# Patient Record
Sex: MALE | Race: BLACK OR AFRICAN AMERICAN | NOT HISPANIC OR LATINO | Employment: OTHER | URBAN - METROPOLITAN AREA
[De-identification: names, ages, dates, MRNs, and addresses within clinical notes are randomized per-mention and may not be internally consistent; named-entity substitution may affect disease eponyms.]

---

## 2017-11-29 ENCOUNTER — CLINICAL SUPPORT (OUTPATIENT)
Dept: REHABILITATION | Facility: HOSPITAL | Age: 57
End: 2017-11-29
Attending: FAMILY MEDICINE
Payer: MEDICARE

## 2017-11-29 DIAGNOSIS — R29.898 LEFT LEG WEAKNESS: ICD-10-CM

## 2017-11-29 DIAGNOSIS — R26.81 UNSTEADY GAIT: ICD-10-CM

## 2017-11-29 DIAGNOSIS — I69.30 LATE EFFECT OF CEREBROVASCULAR ACCIDENT (CVA): Primary | ICD-10-CM

## 2017-11-29 PROCEDURE — G8978 MOBILITY CURRENT STATUS: HCPCS | Mod: CK,PN

## 2017-11-29 PROCEDURE — 97110 THERAPEUTIC EXERCISES: CPT | Mod: PN

## 2017-11-29 PROCEDURE — G8979 MOBILITY GOAL STATUS: HCPCS | Mod: CK,PN

## 2017-11-29 PROCEDURE — 97161 PT EVAL LOW COMPLEX 20 MIN: CPT | Mod: PN

## 2017-11-29 NOTE — PLAN OF CARE
TIME RECORD    Date: 11/29/2017    Start Time:  1300  Stop Time:  1400    PROCEDURES:    TIMED  Procedure Time Min.   There ex Start:1330  Stop:1345     Start:  Stop:     Start:  Stop:     Start:  Stop:          UNTIMED  Procedure Time Min.   eval Start:1300  Stop:1330     Start:  Stop:      Total Timed Minutes:  15  Total Timed Units:  1  Total Untimed Units:  1  Charges Billed/# of units:  2    OUTPATIENT PHYSICAL THERAPY   PATIENT EVALUATION  Onset Date: 11/29/11  Primary Diagnosis:   1. Late effect of cerebrovascular accident (CVA)     2. Left leg weakness     3. Unsteady gait       Treatment Diagnosis: debility due to hx of CVA  No past medical history on file.  Precautions: fall  Prior Therapy: for previous CVA  Medications: Surya Villagomez currently has no medications in their medication list.  Nutrition:  Normal  History of Present Illness: reports has sustained two large CVA's in the past - difficulty w/ general mobility as a consequence  Prior Level of Function: Assistive Device  Social History: disabled  Place of Residence (Steps/Adaptations): lives alone in ground floor apartment  Functional Deficits Leading to Referral/Nature of Injury: reports difficulty w/ general mobility  Patient Therapy Goals: improve safety during amb.    Subjective     Surya Villagomez states that his hx of mult. CVA's limits his ability to amb. safely.    Pain:  Location: n/a    Objective     Posture: flexed posture lt UE; increased rigidity in lt sided extremeties  Palpation: n/a  Sensation: intact  DTRs:  Range of Motion/Strength: MMT = 4-/5 lt hip and knee, 2+/5 lt ankle; AROM = limited 25% lt LE throughout    Flexibility: limited lt sided extremities due to hypertonicity  Gait: 100 ft w/ SC and SBA  Analysis: lt sided hip hiking  Bed Mobility: NT  Transfers: Assistance - SBA  Special Tests: Tinetti = 12  Other:   Treatment: x 15 standing gracy. LE there ex (2.5#) = hip ABD, marching, mini squats, calf raises; x 20 ft ea  balance training in // bars = side stepping and backwards gt; x 15 seated gracy. LE there ex = marching (2.5#), ball squeezes, LAQ (2.5#), hip ABD (GTB)    Assessment       Initial Assessment (Pertinent finding, problem list and factors affecting outcome): presents w/ strength/balance/mobility deficits due to hx of mult. CVA's; patient would benefit from PT to address these areas and to establish HEP  Rehab Potiential: good    Short Term Goals (2 Weeks):   1.  Aki. x 20 reps balance there ex  2.  Amb. 200 ft w/ SC and SBA    Long Term Goals (4 Weeks):   1.  Demo comp w/ HEP  2.  Improve Tinetti score to 16    Plan     Certification Period: 11/29/17 to 12/30/17  Recommended Treatment Plan: 2 times per week for 4 weeks (starting wk of 12/03/17): Gait Training, Group Therapy, Manual Therapy, Therapeutic Activites, Therapeutic Exercise and Other HEP  Other Recommendations: NA      Therapist: Emre Phelps, PT    I CERTIFY THE NEED FOR THESE SERVICES FURNISHED UNDER THIS PLAN OF TREATMENT AND WHILE UNDER MY CARE    Physician's comments: ________________________________________________________________________________________________________________________________________________      Physician's Name: ___________________________________

## 2017-12-06 ENCOUNTER — CLINICAL SUPPORT (OUTPATIENT)
Dept: REHABILITATION | Facility: HOSPITAL | Age: 57
End: 2017-12-06
Attending: FAMILY MEDICINE
Payer: MEDICARE

## 2017-12-06 DIAGNOSIS — R29.898 LEFT LEG WEAKNESS: ICD-10-CM

## 2017-12-06 DIAGNOSIS — R26.81 UNSTEADY GAIT: ICD-10-CM

## 2017-12-06 PROCEDURE — 97110 THERAPEUTIC EXERCISES: CPT | Mod: PN

## 2017-12-06 NOTE — PROGRESS NOTES
Name: Surya Villagomez  Clinic Number: [id number]  Date of Treatment: 12/06/2017   Diagnosis:   Encounter Diagnoses   Name Primary?    Left leg weakness     Unsteady gait        Time in: 1252  Time Out: 1348  Total Treatment Time: 56        Subjective:    Surya reports no pain just stiffness on the L side of his body.  Patient reports their pain to be 0/10 on a 0-10 scale with 0 being no pain and 10 being the worst pain imaginable.    Objective    Patient received individual therapy to increase strength, endurance, ROM, flexibility, posture and core stabilization with 0 patients with activities as follows:     Surya received therapeutic exercises to develop strength, endurance, ROM, flexibility, posture and core stabilization for 56 minutes including:     nustep x 10 min L-4  gastroc stretch standing 1/2 roll 3 x 30 sec  Hamstring stretch 3 x 30 sec seated  mini squats 3 x 10 reps  Marching 3 x 10 reps  Standing abd B LE 3 x 10 reps  HR/TR  3 x 10 reps  Bridging x 30 reps  SLR 3 x 10 reps B LE  SAQ 3 x 10 reps B LE      Pt demo good understanding of the education provided. Surya demonstrated good return demonstration of activities.     Assessment:   Increased rigidity on L side.    Pt will continue to benefit from skilled PT intervention. Medical Necessity is demonstrated by:  Fall Risk, Unable to participate in daily activities, Continued inability to participate in vocational pursuits, Unable to participate fully in daily activities, Requires skilled supervision to complete and progress HEP and Weakness.    Patient is making good progress towards established goals.          Plan:  Continue with established Plan of Care towards PT goals.

## 2017-12-11 ENCOUNTER — CLINICAL SUPPORT (OUTPATIENT)
Dept: REHABILITATION | Facility: HOSPITAL | Age: 57
End: 2017-12-11
Attending: FAMILY MEDICINE
Payer: MEDICARE

## 2017-12-11 DIAGNOSIS — R26.81 UNSTEADY GAIT: ICD-10-CM

## 2017-12-11 DIAGNOSIS — R29.898 LEFT LEG WEAKNESS: ICD-10-CM

## 2017-12-11 PROCEDURE — 97110 THERAPEUTIC EXERCISES: CPT | Mod: PN

## 2017-12-15 ENCOUNTER — CLINICAL SUPPORT (OUTPATIENT)
Dept: REHABILITATION | Facility: HOSPITAL | Age: 57
End: 2017-12-15
Attending: FAMILY MEDICINE
Payer: MEDICARE

## 2017-12-15 DIAGNOSIS — I69.30 LATE EFFECT OF CEREBROVASCULAR ACCIDENT (CVA): Primary | ICD-10-CM

## 2017-12-15 PROCEDURE — 97110 THERAPEUTIC EXERCISES: CPT | Mod: PN

## 2017-12-15 PROCEDURE — 97116 GAIT TRAINING THERAPY: CPT | Mod: PN

## 2017-12-15 NOTE — PROGRESS NOTES
Name: Surya Villagomez  Clinic Number: [id number]  Date of Treatment: 12/15/2017   Diagnosis:   Encounter Diagnosis   Name Primary?    Late effect of cerebrovascular accident (CVA) Yes       Time in: 1300  Time Out: 1355  Total Treatment Time: 55  Group Time: NA      Subjective:    Surya reports no real c/o pain.  Patient reports their pain to be n/a/10 on a 0-10 scale with 0 being no pain and 10 being the worst pain imaginable.    Objective    Surya received therapeutic exercises to develop strength, endurance and balance for 45 minutes including:     X 10 min NuStep (L2)  X 20 standing gracy. LE there ex (2#) = mini squats, hip ABD, calf raises, marching  X 15 ft ea balance training w/ CGA/min assist = side stepping and backwards gt  X 20 seated gracy. LE there ex = LAQ (2#), marching (2#), ball squeezes, hip ABD (GTB)  X 15 ea shuttle mini squats and calf raises (50#)  X 15 SportsArt knee curl (22#)  X 15 static standing holds  amb 2 x 100 ft w/ SC and SBA    Assessment:     Mr. Villagomez was able to escobar. tx session w/out increase in symptoms.    Pt will continue to benefit from skilled PT intervention. Medical Necessity is demonstrated by:  Fall Risk, Unable to participate fully in daily activities and Weakness.    Patient is making fair progress towards established goals.    New/Revised goals: N/A      Plan:  Continue with established Plan of Care towards PT goals.

## 2017-12-20 ENCOUNTER — CLINICAL SUPPORT (OUTPATIENT)
Dept: REHABILITATION | Facility: HOSPITAL | Age: 57
End: 2017-12-20
Attending: FAMILY MEDICINE
Payer: MEDICARE

## 2017-12-20 DIAGNOSIS — R29.898 LEFT LEG WEAKNESS: ICD-10-CM

## 2017-12-20 DIAGNOSIS — R26.81 UNSTEADY GAIT: ICD-10-CM

## 2017-12-20 PROCEDURE — 97110 THERAPEUTIC EXERCISES: CPT | Mod: PN

## 2017-12-20 NOTE — PROGRESS NOTES
Name: Surya Villagomez  Clinic Number: [id number]  Date of Treatment: 12/20/2017   Diagnosis:   Encounter Diagnoses   Name Primary?    Left leg weakness     Unsteady gait        Time in: 1158  Time Out: 1251  Total Treatment Time: 53      Subjective:    Surya reports no changes.  Patient reports their pain to be 0/10 on a 0-10 scale with 0 being no pain and 10 being the worst pain imaginable.    Objective  Surya received therapeutic exercises to develop strength, endurance, ROM, flexibility and posture for 53 minutes including:   X 10 min NuStep L3   X 20 standing gracy. LE there ex (2#) = mini squats, hip ABD, calf raises, marching   3 X 15 ft ea balance training w/ CGA/min assist = side stepping and backwards gt   2 X 20 seated gracy. LE there ex = LAQ (2#), marching (2#), ball squeezes, hip ABD (GTB)   2 X 15 ea shuttle mini squats and calf raises (50#)   X 15 SportsArt knee curl (22#)   2 x 20sStatic standing EO FA/EC FT     Amb 2 x 100 ft w/o AD with SBA   Gait training 6ft fwd/bwd/sidestepping    Pt demo good understanding of the education provided. Surya demonstrated good return demonstration of activities.     Assessment:   Patient motivated and performing therex with good knowledge of technique.  Good balance with static standing performed today.    Pt will continue to benefit from skilled PT intervention. Medical Necessity is demonstrated by:  Fall Risk, Unable to participate fully in daily activities, Requires skilled supervision to complete and progress HEP and Weakness.    Patient is making good progress towards established goals.    Plan:  Continue with established Plan of Care towards PT goals.

## 2017-12-27 ENCOUNTER — CLINICAL SUPPORT (OUTPATIENT)
Dept: REHABILITATION | Facility: HOSPITAL | Age: 57
End: 2017-12-27
Attending: FAMILY MEDICINE
Payer: MEDICARE

## 2017-12-27 DIAGNOSIS — R26.81 UNSTEADY GAIT: ICD-10-CM

## 2017-12-27 DIAGNOSIS — R29.898 LEFT LEG WEAKNESS: ICD-10-CM

## 2017-12-27 PROCEDURE — 97110 THERAPEUTIC EXERCISES: CPT | Mod: PN

## 2017-12-27 NOTE — PROGRESS NOTES
Name: Surya Villagomez  Clinic Number: [id number]  Date of Treatment: 12/27/2017   Diagnosis: Left leg weakness due to previous CVA    Time in: 1245  Time Out: 1340  Total Treatment Time: 55      Subjective:    Surya reports improvement of symptoms.  Patient reports their pain to be n/a/10 on a 0-10 scale with 0 being no pain and 10 being the worst pain imaginable.    Objective  Surya received therapeutic exercises to develop strength, endurance, ROM and posture for 55 minutes including:  X 10 min NuStep L3  2 X 20 standing gracy. LE there ex (2#) = mini squats, hip ABD, calf raises, marching  3 X 15 ft ea balance training w/ CGA/min assist = side stepping and backwards gt  2 X 20 seated gracy. LE there ex = LAQ (2#), marching (2#), ball squeezes, hip ABD (GTB)  2 X 15 ea shuttle mini squats and calf raises (50#)  2 X 20 SportsArt knee curl/knee extension (22#)  2 x 20sStatic standing EO FA/EC FT    Amb 2 x 100 ft w/o AD with SBA      Written Home Exercises Provided: Cont with HEP  Pt demo good understanding of the education provided. Surya demonstrated good return demonstration of activities.     Assessment:   Pt will continue to benefit from skilled PT intervention. Medical Necessity is demonstrated by:  Fall Risk, Requires skilled supervision to complete and progress HEP and Weakness.    Patient is making good progress towards established goals.    Plan:  Continue with established Plan of Care towards PT goals.

## 2018-01-10 DIAGNOSIS — I63.9 CVA (CEREBROVASCULAR ACCIDENT): Primary | ICD-10-CM

## 2018-02-01 ENCOUNTER — DOCUMENTATION ONLY (OUTPATIENT)
Dept: REHABILITATION | Facility: HOSPITAL | Age: 58
End: 2018-02-01

## 2018-02-01 ENCOUNTER — CLINICAL SUPPORT (OUTPATIENT)
Dept: REHABILITATION | Facility: HOSPITAL | Age: 58
End: 2018-02-01
Attending: FAMILY MEDICINE
Payer: MEDICARE

## 2018-02-01 NOTE — PROGRESS NOTES
REHAB SERVICES OUTPATIENT DISCHARGE SUMMARY  Physical Therapy      Name:  Surya Villagomez  Date:  02/01/18  Date of Evaluation:  11/29/17  Physician:  Dickson Hinson IV, MD  Total # Of Visits:  6  Cancelled:  2  No Shows:  2  Diagnosis:  No diagnosis found.    Physical/Functional Status:  At time of discharge, patient was able to escobar. x 60 min of strength/mobility training.    The patient is to be discharged from our Therapy service for the following reason(s):  Patient has not attended therapy since 12/27/17.    Degree of Goal Achievement:  Patient has partially met goals    Patient Education:  NA    Discharge Plan:  Other:  Follow up w/ MD

## 2018-02-02 NOTE — PROGRESS NOTES
"REHAB SERVICES OUTPATIENT DISCHARGE SUMMARY  Occupational Therapy      Name:  Surya Villagomez  Date:  2-1-18  Date of Evaluation:  Screened only  Physician:    Total # Of Visits:  0  Cancelled:  0  No Shows:  0  Diagnosis:  R hemiplegia 2/2 CVA in 2011    Physical/Functional Status: Pt arrived to evaluation with R UE in a wrist support brace, cane. This writer screened patient and discharged services based on history of CVA 2011 and subsequent hemiplegia, flexor synergy pattern, R UE contractures. Patient has no AROM R UE. His stated goal was to " get arm working again". Therapist took extended time to explain reason for no OT services needed.    The patient is to be discharged from our Therapy service for the following reason(s):  Other:  Patient has no OT needs.        Patient Education:  Therapist educated patient on role of OT and was educated regarding discharge of services.          "

## 2019-05-10 ENCOUNTER — OFFICE VISIT (OUTPATIENT)
Dept: HEMATOLOGY/ONCOLOGY | Facility: CLINIC | Age: 59
End: 2019-05-10
Payer: MEDICARE

## 2019-05-10 VITALS — RESPIRATION RATE: 16 BRPM | TEMPERATURE: 98 F

## 2019-05-10 DIAGNOSIS — I10 ESSENTIAL HYPERTENSION: ICD-10-CM

## 2019-05-10 DIAGNOSIS — E07.9 THYROID DYSFUNCTION: ICD-10-CM

## 2019-05-10 DIAGNOSIS — D68.59 HYPERCOAGULATION SYNDROME: ICD-10-CM

## 2019-05-10 DIAGNOSIS — I21.9 ACUTE MYOCARDIAL INFARCTION, UNSPECIFIED MI TYPE, UNSPECIFIED ARTERY: ICD-10-CM

## 2019-05-10 DIAGNOSIS — I63.311 CEREBROVASCULAR ACCIDENT (CVA) DUE TO THROMBOSIS OF RIGHT MIDDLE CEREBRAL ARTERY: ICD-10-CM

## 2019-05-10 DIAGNOSIS — D70.8 OTHER NEUTROPENIA: ICD-10-CM

## 2019-05-10 DIAGNOSIS — N18.2 ANEMIA ASSOCIATED WITH STAGE 2 CHRONIC RENAL FAILURE: ICD-10-CM

## 2019-05-10 DIAGNOSIS — Z95.0 PACEMAKER: ICD-10-CM

## 2019-05-10 DIAGNOSIS — K90.9 VITAMIN B12 DEFICIENCY DUE TO INTESTINAL MALABSORPTION: ICD-10-CM

## 2019-05-10 DIAGNOSIS — I25.118 CORONARY ARTERY DISEASE OF NATIVE ARTERY OF NATIVE HEART WITH STABLE ANGINA PECTORIS: ICD-10-CM

## 2019-05-10 DIAGNOSIS — I82.412 DVT FEMORAL (DEEP VENOUS THROMBOSIS) WITH THROMBOPHLEBITIS, LEFT: ICD-10-CM

## 2019-05-10 DIAGNOSIS — D50.0 ANEMIA DUE TO CHRONIC BLOOD LOSS: ICD-10-CM

## 2019-05-10 DIAGNOSIS — D50.0 IRON DEFICIENCY ANEMIA DUE TO CHRONIC BLOOD LOSS: Primary | ICD-10-CM

## 2019-05-10 DIAGNOSIS — D63.1 ANEMIA ASSOCIATED WITH STAGE 2 CHRONIC RENAL FAILURE: ICD-10-CM

## 2019-05-10 DIAGNOSIS — Z83.2 FAMILY HISTORY OF HYPERCOAGULABILITY: ICD-10-CM

## 2019-05-10 DIAGNOSIS — E53.8 VITAMIN B12 DEFICIENCY DUE TO INTESTINAL MALABSORPTION: ICD-10-CM

## 2019-05-10 DIAGNOSIS — Z86.2 PERSONAL HISTORY OF HYPERCOAGULABLE STATE: ICD-10-CM

## 2019-05-10 PROCEDURE — 99204 OFFICE O/P NEW MOD 45 MIN: CPT | Mod: ,,, | Performed by: INTERNAL MEDICINE

## 2019-05-10 PROCEDURE — 99204 PR OFFICE/OUTPT VISIT, NEW, LEVL IV, 45-59 MIN: ICD-10-PCS | Mod: ,,, | Performed by: INTERNAL MEDICINE

## 2019-05-10 RX ORDER — OMEGA-3-ACID ETHYL ESTERS 1 G/1
2 CAPSULE, LIQUID FILLED ORAL
COMMUNITY

## 2019-05-10 RX ORDER — CYANOCOBALAMIN 1000 UG/ML
INJECTION, SOLUTION INTRAMUSCULAR; SUBCUTANEOUS
COMMUNITY
Start: 2019-03-29

## 2019-05-10 RX ORDER — AMIODARONE HYDROCHLORIDE 200 MG/1
TABLET ORAL DAILY
Status: ON HOLD | COMMUNITY
End: 2019-08-21 | Stop reason: HOSPADM

## 2019-05-10 RX ORDER — LISINOPRIL 5 MG/1
TABLET ORAL
Refills: 11 | Status: ON HOLD | COMMUNITY
Start: 2019-05-01 | End: 2019-08-21 | Stop reason: HOSPADM

## 2019-05-10 RX ORDER — CLOPIDOGREL BISULFATE 75 MG/1
TABLET ORAL
COMMUNITY
Start: 2018-04-13

## 2019-05-10 RX ORDER — ATORVASTATIN CALCIUM 20 MG/1
TABLET, FILM COATED ORAL
Refills: 11 | COMMUNITY
Start: 2019-05-01

## 2019-05-10 RX ORDER — FUROSEMIDE 20 MG/1
TABLET ORAL
Refills: 1 | COMMUNITY
Start: 2019-04-15

## 2019-05-10 RX ORDER — AMOXICILLIN 500 MG
1040 CAPSULE ORAL
COMMUNITY

## 2019-05-10 RX ORDER — METOPROLOL SUCCINATE 50 MG/1
75 TABLET, EXTENDED RELEASE ORAL
Status: ON HOLD | COMMUNITY
Start: 2017-06-10 | End: 2019-08-21 | Stop reason: HOSPADM

## 2019-05-10 RX ORDER — NITROGLYCERIN 0.4 MG/1
0.4 TABLET SUBLINGUAL
COMMUNITY
Start: 2018-10-05

## 2019-05-10 NOTE — LETTER
May 10, 2019      Geeta Kim, FN  [address]  [place] [address]           [facility]  [address]  [address] [place] [address]  Phone: [phone]  Fax: [phone]          Patient: Surya Villagomez   MR Number: [id number]   YOB: 1960   Date of Visit: 5/10/2019       Dear Geeta Kim:    Thank you for referring Surya Villagomez to me for evaluation. Attached you will find relevant portions of my assessment and plan of care.    If you have questions, please do not hesitate to call me. I look forward to following Surya Villagomez along with you.    Sincerely,    GURPREET Matta MD    Enclosure  CC:  No Recipients    If you would like to receive this communication electronically, please contact [email or web address] or [phone] to request more information on [employer] Link access.    For providers and/or their staff who would like to refer a patient to [facility], please contact us through our one-stop-shop provider referral line, [facility], at [phone].    If you feel you have received this communication in error or would no longer like to receive these types of communications, please e-mail [email or web address]

## 2019-05-10 NOTE — PROGRESS NOTES
[facility] History & Physical    Subjective:      Patient ID:   Surya Villagomez  58 y.o. male  1960  Geeta Kim NP      Chief Complaint:   Anemia     HPI:  58 y.o. male with history of intermittent anemia and leukopenia. He has been on B-12 injections.    He is status post acute myocardial infarction and has coronary stent, and has pacemaker placement in the past. Other history includes hypertension. He is status post right sided CVA in 2011 and left leg DVT in 2011. He takes Xarelto daily and aspirin 81 mg daily .    He smoked less than 1 pack per week for approximately 30 years and quit approximately 10 years ago. He does not drink alcohol with regularity. He denies allergies to medications. He was a  with the [employer] long-term and is presently on disability.     He has a strong family history of clot events. His mother had myocardial infarction, history of DVT, and had pulmonary embolus after back surgery. He has a brother with history of DVT following surgery and another brother with a history of DVT. Dad had heart disease. He has 2 children alive and well     ROS:   GEN: normal without any fever, night sweats or weight loss  HEENT: See history of present illness.   No HA's, sore throat, stiff neck, changes in vision  CV:  see history of present illness. No CP, SOB, PND, ISBELL or orthopnea  PULM: normal with no SOB, cough, hemoptysis, sputum or pleuritic pain  GI: normal with no abdominal pain, nausea, vomiting, constipation, diarrhea, melanotic stools, BRBPR, or hematemesis  : normal with no hematuria, dysuria  BREAST: normal with no mass, discharge, pain  SKIN: normal with no rash, erythema, bruising, or swelling.        Current Outpatient Medications:     amiodarone (PACERONE) 200 MG Tab, Take by mouth once daily., Disp: , Rfl:     aspirin-calcium carbonate 81 mg-300 mg calcium(777 mg) Tab, Take 81 mg by mouth., Disp: , Rfl:     atorvastatin (LIPITOR) 20 MG tablet, , Disp: , Rfl:  11    clopidogrel (PLAVIX) 75 mg tablet, TAKE 1 TABLET(75 MG) BY MOUTH DAILY, Disp: , Rfl:     cyanocobalamin 1,000 mcg/mL injection, ADMINISTER 1 ML(1000 MCG) IN THE MUSCLE EVERY 30 DAYS, Disp: , Rfl:     fish oil-omega-3 fatty acids 300-1,000 mg capsule, Take 1,040 mg by mouth., Disp: , Rfl:     furosemide (LASIX) 20 MG tablet, TK 1 T PO  D, Disp: , Rfl: 1    lisinopril (PRINIVIL,ZESTRIL) 5 MG tablet, , Disp: , Rfl: 11    metoprolol succinate (TOPROL-XL) 50 MG 24 hr tablet, Take 75 mg by mouth., Disp: , Rfl:     nitroGLYCERIN (NITROSTAT) 0.4 MG SL tablet, Place 0.4 mg under the tongue., Disp: , Rfl:     omega-3 acid ethyl esters (LOVAZA) 1 gram capsule, Take 2 g by mouth., Disp: , Rfl:     rivaroxaban (XARELTO) 15 mg Tab, Take 1 mL by mouth., Disp: , Rfl:           Objective:   Vitals:  Temperature 97.6 °F (36.4 °C), resp. rate 16.    Physical Examination:   GEN: no apparent distress, comfortable  HEAD: atraumatic and normocephalic  EYES: no pallor, no icterus  ENT:  no pharyngeal erythema, external ears WNL; no nasal discharge; no thrush  NECK: no masses, thyroid normal, trachea midline, no LAD/LN's, supple  CV: RRR with no murmur; normal pulse; normal S1 and S2; pacemaker noted at chest wall   CHEST: Normal respiratory effort; CTAB; normal breath sounds; no wheeze or crackles  ABDOM: nontender and nondistended; soft;  no rebound/guarding  MUSC/he has left sided weakness, left arm and hand has contracture with splint in place   EXTREM: no clubbing, cyanosis, or edema   SKIN: no rashes, lesions, ulcers, petechiae   : no CVAT  NEURO: left sided weakness with contracture of left arm and hand. Left leg weakness noted, wheelchair-bound   PSYCH: normal mood, affect and behavior  LYMPH: normal cervical, supraclavicular, axillary and groin LN's        I have ordered laboratory studies at [facility] to evaluate the anemia, leukopenia, history of CVA, DVT, and family history of clot events.    Assessment:    (1) 58 y.o. male with diagnosis of intermittent anemia and leukopenia, currently on B-12 injections.     (2) history of CVA and left leg DVT and family history of multiple members with clot events.    (3) coronary artery disease, hypertension, status post coronary stent placement and status post pacemaker insertion      I have ordered laboratory evaluation to evaluate the anemia and leukopenia. Specifically will be looking for vitamin and iron deficiencies, renal insufficiency, thyroid disorders, protein dyscrasias.    I have ordered laboratory evaluation to evaluate the history of CVA, left leg DVT, and multiple members with pulmonary embolus and DVT    Hypercoagulation panel will be done at [facility]. He will follow-up when necessary 3 weeks to review the results and the [place] office.              Plan:         Follow up in about 3 weeks (around 5/31/2019) for check of blood status after therapy, check blood clot status after therapy..    I have explained and the patient understands all of  the current recommendation(s). I have answered all of their questions to the best of my ability and to their complete satisfaction.

## 2019-05-30 ENCOUNTER — OFFICE VISIT (OUTPATIENT)
Dept: HEMATOLOGY/ONCOLOGY | Facility: CLINIC | Age: 59
End: 2019-05-30
Payer: MEDICARE

## 2019-05-30 VITALS
SYSTOLIC BLOOD PRESSURE: 158 MMHG | RESPIRATION RATE: 22 BRPM | TEMPERATURE: 99 F | DIASTOLIC BLOOD PRESSURE: 103 MMHG | HEART RATE: 98 BPM | WEIGHT: 185 LBS

## 2019-05-30 DIAGNOSIS — R79.1 LOW FACTOR XII ACTIVITY: ICD-10-CM

## 2019-05-30 DIAGNOSIS — R79.89 ELEVATED HOMOCYSTEINE: ICD-10-CM

## 2019-05-30 DIAGNOSIS — D51.8 ANEMIA OF DECREASED VITAMIN B12 ABSORPTION: ICD-10-CM

## 2019-05-30 PROCEDURE — 99213 OFFICE O/P EST LOW 20 MIN: CPT | Mod: 25,,, | Performed by: INTERNAL MEDICINE

## 2019-05-30 PROCEDURE — 96372 PR INJECTION,THERAP/PROPH/DIAG2ST, IM OR SUBCUT: ICD-10-PCS | Mod: ,,, | Performed by: INTERNAL MEDICINE

## 2019-05-30 PROCEDURE — 3080F DIAST BP >= 90 MM HG: CPT | Mod: ,,, | Performed by: INTERNAL MEDICINE

## 2019-05-30 PROCEDURE — 3080F PR MOST RECENT DIASTOLIC BLOOD PRESSURE >= 90 MM HG: ICD-10-PCS | Mod: ,,, | Performed by: INTERNAL MEDICINE

## 2019-05-30 PROCEDURE — 96372 THER/PROPH/DIAG INJ SC/IM: CPT | Mod: ,,, | Performed by: INTERNAL MEDICINE

## 2019-05-30 PROCEDURE — 99213 PR OFFICE/OUTPT VISIT, EST, LEVL III, 20-29 MIN: ICD-10-PCS | Mod: 25,,, | Performed by: INTERNAL MEDICINE

## 2019-05-30 PROCEDURE — 3077F SYST BP >= 140 MM HG: CPT | Mod: ,,, | Performed by: INTERNAL MEDICINE

## 2019-05-30 PROCEDURE — 3077F PR MOST RECENT SYSTOLIC BLOOD PRESSURE >= 140 MM HG: ICD-10-PCS | Mod: ,,, | Performed by: INTERNAL MEDICINE

## 2019-05-30 RX ORDER — CYANOCOBALAMIN 1000 UG/ML
1000 INJECTION, SOLUTION INTRAMUSCULAR; SUBCUTANEOUS ONCE
Status: COMPLETED | OUTPATIENT
Start: 2019-05-30 | End: 2019-05-30

## 2019-05-30 RX ORDER — CYANOCOBALAMIN 1000 UG/ML
1000 INJECTION, SOLUTION INTRAMUSCULAR; SUBCUTANEOUS ONCE
OUTPATIENT
Start: 2019-05-30

## 2019-05-30 RX ADMIN — CYANOCOBALAMIN 1000 MCG: 1000 INJECTION, SOLUTION INTRAMUSCULAR; SUBCUTANEOUS at 04:05

## 2019-05-30 NOTE — LETTER
May 30, 2019      Geeta Kim, GENIE  [address]  [place]  [place] [address]           [facility]  [address]  [place] [address]  Phone: [phone]  Fax: [phone]          Patient: Surya Villagomez   MR Number: [id number]   YOB: 1960   Date of Visit: 5/30/2019       Dear Geeta Kim:    Thank you for referring Surya Villagomez to me for evaluation. Attached you will find relevant portions of my assessment and plan of care.    If you have questions, please do not hesitate to call me. I look forward to following Surya Villagomez along with you.    Sincerely,    Kaci Pollock RN    Enclosure  CC:  No Recipients    If you would like to receive this communication electronically, please contact [email or web address] or [phone] to request more information on [employer] Link access.    For providers and/or their staff who would like to refer a patient to [facility], please contact us through our one-stop-shop provider referral line, [facility], at [phone].    If you feel you have received this communication in error or would no longer like to receive these types of communications, please e-mail [email or web address]

## 2019-06-05 PROBLEM — R79.89 ELEVATED HOMOCYSTEINE: Status: ACTIVE | Noted: 2019-06-05

## 2019-06-05 PROBLEM — R79.1: Status: ACTIVE | Noted: 2019-06-05

## 2019-06-06 NOTE — PROGRESS NOTES
[facility] History & Physical    Subjective:      Patient ID:   Surya Villagomez  59 y.o. male  1960  Geeta Kim NP      Chief Complaint:   Anemia     HPI:  59 y.o. male with history of intermittent anemia and leukopenia. He has been on B-12 injections.    He is status post acute myocardial infarction and has coronary stent, and has pacemaker placement in the past. Other history includes hypertension. He is status post right sided CVA in 2011 and left leg DVT in 2011. He takes Xarelto daily and aspirin 81 mg daily .    He smoked less than 1 pack per week for approximately 30 years and quit approximately 10 years ago. He does not drink alcohol with regularity. He denies allergies to medications. He was a  with the [employer] long-term and is presently on disability.     He has a strong family history of clot events. His mother had myocardial infarction, history of DVT, and had pulmonary embolus after back surgery. He has a brother with history of DVT following surgery and another brother with a history of DVT. Dad had heart disease. He has 2 children alive and well     ROS:   GEN: normal without any fever, night sweats or weight loss  HEENT: See history of present illness.   No HA's, sore throat, stiff neck, changes in vision  CV:  see history of present illness. No CP, SOB, PND, ISBELL or orthopnea  PULM: normal with no SOB, cough, hemoptysis, sputum or pleuritic pain  GI: normal with no abdominal pain, nausea, vomiting, constipation, diarrhea, melanotic stools, BRBPR, or hematemesis  : normal with no hematuria, dysuria  BREAST: normal with no mass, discharge, pain  SKIN: normal with no rash, erythema, bruising, or swelling.        Current Outpatient Medications:     amiodarone (PACERONE) 200 MG Tab, Take by mouth once daily., Disp: , Rfl:     aspirin-calcium carbonate 81 mg-300 mg calcium(777 mg) Tab, Take 81 mg by mouth., Disp: , Rfl:     atorvastatin (LIPITOR) 20 MG tablet, , Disp: , Rfl:  11    clopidogrel (PLAVIX) 75 mg tablet, TAKE 1 TABLET(75 MG) BY MOUTH DAILY, Disp: , Rfl:     cyanocobalamin 1,000 mcg/mL injection, ADMINISTER 1 ML(1000 MCG) IN THE MUSCLE EVERY 30 DAYS, Disp: , Rfl:     fish oil-omega-3 fatty acids 300-1,000 mg capsule, Take 1,040 mg by mouth., Disp: , Rfl:     furosemide (LASIX) 20 MG tablet, TK 1 T PO  D, Disp: , Rfl: 1    lisinopril (PRINIVIL,ZESTRIL) 5 MG tablet, , Disp: , Rfl: 11    metoprolol succinate (TOPROL-XL) 50 MG 24 hr tablet, Take 75 mg by mouth., Disp: , Rfl:     nitroGLYCERIN (NITROSTAT) 0.4 MG SL tablet, Place 0.4 mg under the tongue., Disp: , Rfl:     omega-3 acid ethyl esters (LOVAZA) 1 gram capsule, Take 2 g by mouth., Disp: , Rfl:     rivaroxaban (XARELTO) 15 mg Tab, Take 1 mL by mouth., Disp: , Rfl:           Objective:   Vitals:  Blood pressure (!) 158/103, pulse 98, temperature 98.5 °F (36.9 °C), resp. rate (!) 22, weight 83.9 kg (185 lb).    Physical Examination:   GEN: no apparent distress, comfortable  HEAD: atraumatic and normocephalic  EYES: no pallor, no icterus  ENT:  no pharyngeal erythema, external ears WNL; no nasal discharge; no thrush  NECK: no masses, thyroid normal, trachea midline, no LAD/LN's, supple  CV: RRR with no murmur; normal pulse; normal S1 and S2; pacemaker noted at chest wall   CHEST: Normal respiratory effort; CTAB; normal breath sounds; no wheeze or crackles  ABDOM: nontender and nondistended; soft;  no rebound/guarding  MUSC/he has left sided weakness, left arm and hand has contracture with splint in place   EXTREM: no clubbing, cyanosis, or edema   SKIN: no rashes, lesions, ulcers, petechiae   : no CVAT  NEURO: left sided weakness with contracture of left arm and hand. Left leg weakness noted, wheelchair-bound   PSYCH: normal mood, affect and behavior  LYMPH: normal cervical, supraclavicular, axillary and groin LN's        I have ordered laboratory studies at [facility] to evaluate the anemia, leukopenia,  history of CVA, DVT, and family history of clot events.    Factor 12 is low at 52, this is significant in family hx of clot events.  Also homocystene is increased at 15.  Begin multivitamen daily.    He is on B 12 and Fe daily    Assessment:   (1) 59 y.o. male with diagnosis of intermittent anemia and leukopenia, currently on B-12 injections.     (2) history of CVA and left leg DVT and family history of multiple members with clot events.    Factor 12 is low and homocystene is increased. This is factor in CVA, Clot hx in family.  He has hx of DVT.    (3) coronary artery disease, hypertension, status post coronary stent placement and status post pacemaker insertion.    He is on ASA 81mg, PLAVIX 75 mg and Xarelto 15 mg.  To Ms. Julio NP: do you want to keep him on all 3 meds.  This reduces risk of thrombotic CVA or DVT.  I am concerned about risk of falling and bleeding complications.               Follow up in about 6 months (around 11/30/2019) for check of blood status after therapy.

## 2019-07-11 ENCOUNTER — TELEPHONE (OUTPATIENT)
Dept: HEMATOLOGY/ONCOLOGY | Facility: CLINIC | Age: 59
End: 2019-07-11

## 2019-07-11 NOTE — TELEPHONE ENCOUNTER
Spoke to pharmacy regarding lasix. Stated pt picked up prescription today. It was ordered by Dr. Kim.

## 2019-07-11 NOTE — TELEPHONE ENCOUNTER
----- Message from Aisha Carrero sent at 7/9/2019  4:06 PM CDT -----  The patient called and said that when he picked up his Lasix at the pharmacy they only gave him 2 pills. He said this is incorrect. He wants us to call [facility] on [address] to find out why they only gave him the 2 pills. Please call him back at [phone].

## 2019-08-17 ENCOUNTER — HOSPITAL ENCOUNTER (INPATIENT)
Facility: HOSPITAL | Age: 59
LOS: 4 days | Discharge: HOME | DRG: 291 | End: 2019-08-21
Attending: EMERGENCY MEDICINE | Admitting: INTERNAL MEDICINE
Payer: MEDICARE

## 2019-08-17 DIAGNOSIS — I50.9 CONGESTIVE HEART FAILURE, UNSPECIFIED HF CHRONICITY, UNSPECIFIED HEART FAILURE TYPE: ICD-10-CM

## 2019-08-17 DIAGNOSIS — Z95.810 AICD (AUTOMATIC CARDIOVERTER/DEFIBRILLATOR) PRESENT: ICD-10-CM

## 2019-08-17 DIAGNOSIS — I42.9 CARDIOMYOPATHY: ICD-10-CM

## 2019-08-17 DIAGNOSIS — N17.9 AKI (ACUTE KIDNEY INJURY): ICD-10-CM

## 2019-08-17 DIAGNOSIS — R07.9 CHEST PAIN: Primary | ICD-10-CM

## 2019-08-17 DIAGNOSIS — I49.9 ARRHYTHMIA: ICD-10-CM

## 2019-08-17 DIAGNOSIS — I47.20 V-TACH: ICD-10-CM

## 2019-08-17 LAB
ALBUMIN SERPL BCP-MCNC: 3.6 G/DL (ref 3.5–5.2)
ALP SERPL-CCNC: 63 U/L (ref 55–135)
ALT SERPL W/O P-5'-P-CCNC: 25 U/L (ref 10–44)
ANION GAP SERPL CALC-SCNC: 12 MMOL/L (ref 8–16)
AST SERPL-CCNC: 21 U/L (ref 10–40)
BASOPHILS # BLD AUTO: 0.04 K/UL (ref 0–0.2)
BASOPHILS NFR BLD: 0.9 % (ref 0–1.9)
BILIRUB SERPL-MCNC: 2.5 MG/DL (ref 0.1–1)
BNP SERPL-MCNC: 2404 PG/ML (ref 0–99)
BUN SERPL-MCNC: 31 MG/DL (ref 8–23)
CALCIUM SERPL-MCNC: 8.9 MG/DL (ref 8.7–10.5)
CHLORIDE SERPL-SCNC: 106 MMOL/L (ref 95–110)
CO2 SERPL-SCNC: 22 MMOL/L (ref 23–29)
CREAT SERPL-MCNC: 1.9 MG/DL (ref 0.5–1.4)
DIFFERENTIAL METHOD: ABNORMAL
EOSINOPHIL # BLD AUTO: 0.1 K/UL (ref 0–0.5)
EOSINOPHIL NFR BLD: 1.5 % (ref 0–8)
ERYTHROCYTE [DISTWIDTH] IN BLOOD BY AUTOMATED COUNT: 19.1 % (ref 11.5–14.5)
EST. GFR  (AFRICAN AMERICAN): 43 ML/MIN/1.73 M^2
EST. GFR  (NON AFRICAN AMERICAN): 37.2 ML/MIN/1.73 M^2
GLUCOSE SERPL-MCNC: 103 MG/DL (ref 70–110)
HCT VFR BLD AUTO: 45.2 % (ref 40–54)
HGB BLD-MCNC: 14.4 G/DL (ref 14–18)
IMM GRANULOCYTES # BLD AUTO: 0.01 K/UL (ref 0–0.04)
IMM GRANULOCYTES NFR BLD AUTO: 0.2 % (ref 0–0.5)
INR PPP: 1.5
LYMPHOCYTES # BLD AUTO: 0.9 K/UL (ref 1–4.8)
LYMPHOCYTES NFR BLD: 20 % (ref 18–48)
MCH RBC QN AUTO: 21.9 PG (ref 27–31)
MCHC RBC AUTO-ENTMCNC: 31.9 G/DL (ref 32–36)
MCV RBC AUTO: 69 FL (ref 82–98)
MONOCYTES # BLD AUTO: 0.6 K/UL (ref 0.3–1)
MONOCYTES NFR BLD: 13.1 % (ref 4–15)
NEUTROPHILS # BLD AUTO: 3 K/UL (ref 1.8–7.7)
NEUTROPHILS NFR BLD: 64.3 % (ref 38–73)
NRBC BLD-RTO: 0 /100 WBC
PLATELET # BLD AUTO: 212 K/UL (ref 150–350)
PMV BLD AUTO: ABNORMAL FL (ref 9.2–12.9)
POTASSIUM SERPL-SCNC: 4 MMOL/L (ref 3.5–5.1)
PROT SERPL-MCNC: 6.5 G/DL (ref 6–8.4)
PROTHROMBIN TIME: 17.3 SEC (ref 11.7–14)
RBC # BLD AUTO: 6.57 M/UL (ref 4.6–6.2)
SODIUM SERPL-SCNC: 140 MMOL/L (ref 136–145)
TROPONIN I SERPL DL<=0.01 NG/ML-MCNC: 0.05 NG/ML (ref 0.02–0.04)
WBC # BLD AUTO: 4.64 K/UL (ref 3.9–12.7)

## 2019-08-17 PROCEDURE — 20000000 HC ICU ROOM

## 2019-08-17 PROCEDURE — 85025 COMPLETE CBC W/AUTO DIFF WBC: CPT

## 2019-08-17 PROCEDURE — 96375 TX/PRO/DX INJ NEW DRUG ADDON: CPT

## 2019-08-17 PROCEDURE — 93005 ELECTROCARDIOGRAM TRACING: CPT

## 2019-08-17 PROCEDURE — 85610 PROTHROMBIN TIME: CPT

## 2019-08-17 PROCEDURE — 99285 EMERGENCY DEPT VISIT HI MDM: CPT | Mod: 25

## 2019-08-17 PROCEDURE — 96365 THER/PROPH/DIAG IV INF INIT: CPT

## 2019-08-17 PROCEDURE — 80053 COMPREHEN METABOLIC PANEL: CPT

## 2019-08-17 PROCEDURE — 84484 ASSAY OF TROPONIN QUANT: CPT

## 2019-08-17 PROCEDURE — 63600175 PHARM REV CODE 636 W HCPCS: Performed by: EMERGENCY MEDICINE

## 2019-08-17 PROCEDURE — 83880 ASSAY OF NATRIURETIC PEPTIDE: CPT

## 2019-08-17 RX ORDER — FUROSEMIDE 10 MG/ML
40 INJECTION INTRAMUSCULAR; INTRAVENOUS
Status: COMPLETED | OUTPATIENT
Start: 2019-08-17 | End: 2019-08-17

## 2019-08-17 RX ORDER — AMIODARONE HYDROCHLORIDE 150 MG/3ML
150 INJECTION, SOLUTION INTRAVENOUS
Status: COMPLETED | OUTPATIENT
Start: 2019-08-17 | End: 2019-08-17

## 2019-08-17 RX ORDER — MAGNESIUM SULFATE HEPTAHYDRATE 40 MG/ML
2 INJECTION, SOLUTION INTRAVENOUS ONCE
Status: COMPLETED | OUTPATIENT
Start: 2019-08-18 | End: 2019-08-18

## 2019-08-17 RX ADMIN — AMIODARONE HYDROCHLORIDE 1 MG/MIN: 1.8 INJECTION, SOLUTION INTRAVENOUS at 10:08

## 2019-08-17 RX ADMIN — FUROSEMIDE 40 MG: 10 INJECTION, SOLUTION INTRAMUSCULAR; INTRAVENOUS at 10:08

## 2019-08-17 RX ADMIN — AMIODARONE HYDROCHLORIDE 150 MG: 50 INJECTION, SOLUTION INTRAVENOUS at 10:08

## 2019-08-18 ENCOUNTER — CLINICAL SUPPORT (OUTPATIENT)
Dept: CARDIOLOGY | Facility: HOSPITAL | Age: 59
DRG: 291 | End: 2019-08-18
Attending: EMERGENCY MEDICINE
Payer: MEDICARE

## 2019-08-18 VITALS
SYSTOLIC BLOOD PRESSURE: 124 MMHG | WEIGHT: 195 LBS | HEIGHT: 71 IN | BODY MASS INDEX: 27.3 KG/M2 | DIASTOLIC BLOOD PRESSURE: 93 MMHG

## 2019-08-18 PROBLEM — I50.9 ACUTE DECOMPENSATED HEART FAILURE: Status: ACTIVE | Noted: 2019-08-18

## 2019-08-18 PROBLEM — I63.311 CEREBROVASCULAR ACCIDENT (CVA) DUE TO THROMBOSIS OF RIGHT MIDDLE CEREBRAL ARTERY: Chronic | Status: ACTIVE | Noted: 2019-05-10

## 2019-08-18 PROBLEM — D50.0 ANEMIA DUE TO CHRONIC BLOOD LOSS: Chronic | Status: ACTIVE | Noted: 2019-05-10

## 2019-08-18 PROBLEM — R29.898 LEFT LEG WEAKNESS: Chronic | Status: ACTIVE | Noted: 2017-11-29

## 2019-08-18 PROBLEM — Z95.0 PACEMAKER: Chronic | Status: ACTIVE | Noted: 2019-05-10

## 2019-08-18 PROBLEM — R79.89 ELEVATED TROPONIN: Status: ACTIVE | Noted: 2019-08-18

## 2019-08-18 PROBLEM — D51.8 ANEMIA OF DECREASED VITAMIN B12 ABSORPTION: Chronic | Status: ACTIVE | Noted: 2019-05-30

## 2019-08-18 PROBLEM — D70.8 OTHER NEUTROPENIA: Chronic | Status: ACTIVE | Noted: 2019-05-10

## 2019-08-18 PROBLEM — R79.89 ELEVATED HOMOCYSTEINE: Chronic | Status: ACTIVE | Noted: 2019-06-05

## 2019-08-18 PROBLEM — R79.1: Chronic | Status: ACTIVE | Noted: 2019-06-05

## 2019-08-18 PROBLEM — I25.118 CORONARY ARTERY DISEASE OF NATIVE ARTERY OF NATIVE HEART WITH STABLE ANGINA PECTORIS: Chronic | Status: ACTIVE | Noted: 2019-05-10

## 2019-08-18 PROBLEM — I82.412 DVT FEMORAL (DEEP VENOUS THROMBOSIS) WITH THROMBOPHLEBITIS, LEFT: Chronic | Status: ACTIVE | Noted: 2019-05-10

## 2019-08-18 PROBLEM — D68.59 HYPERCOAGULATION SYNDROME: Chronic | Status: ACTIVE | Noted: 2019-05-10

## 2019-08-18 PROBLEM — I47.20 V-TACH: Status: ACTIVE | Noted: 2019-08-18

## 2019-08-18 PROBLEM — I10 ESSENTIAL HYPERTENSION: Chronic | Status: ACTIVE | Noted: 2019-05-10

## 2019-08-18 LAB
ANION GAP SERPL CALC-SCNC: 13 MMOL/L (ref 8–16)
BASOPHILS # BLD AUTO: 0.02 K/UL (ref 0–0.2)
BASOPHILS NFR BLD: 0.5 % (ref 0–1.9)
BUN SERPL-MCNC: 32 MG/DL (ref 8–23)
CALCIUM SERPL-MCNC: 8.9 MG/DL (ref 8.7–10.5)
CHLORIDE SERPL-SCNC: 109 MMOL/L (ref 95–110)
CHOLEST SERPL-MCNC: 123 MG/DL (ref 120–199)
CHOLEST/HDLC SERPL: 3.2 {RATIO} (ref 2–5)
CK MB SERPL-MCNC: 3.5 NG/ML (ref 0.1–6.5)
CK MB SERPL-MCNC: 3.8 NG/ML (ref 0.1–6.5)
CK MB SERPL-MCNC: 4.1 NG/ML (ref 0.1–6.5)
CK MB SERPL-MCNC: 4.4 NG/ML (ref 0.1–6.5)
CK SERPL-CCNC: 114 U/L (ref 20–200)
CK SERPL-CCNC: 94 U/L (ref 20–200)
CK SERPL-CCNC: 96 U/L (ref 20–200)
CK SERPL-CCNC: 98 U/L (ref 20–200)
CO2 SERPL-SCNC: 20 MMOL/L (ref 23–29)
CREAT SERPL-MCNC: 1.7 MG/DL (ref 0.5–1.4)
DIFFERENTIAL METHOD: ABNORMAL
EOSINOPHIL # BLD AUTO: 0.1 K/UL (ref 0–0.5)
EOSINOPHIL NFR BLD: 2.6 % (ref 0–8)
ERYTHROCYTE [DISTWIDTH] IN BLOOD BY AUTOMATED COUNT: 19 % (ref 11.5–14.5)
EST. GFR  (AFRICAN AMERICAN): 49.2 ML/MIN/1.73 M^2
EST. GFR  (NON AFRICAN AMERICAN): 42.6 ML/MIN/1.73 M^2
GLUCOSE SERPL-MCNC: 123 MG/DL (ref 70–110)
HCT VFR BLD AUTO: 42.7 % (ref 40–54)
HDLC SERPL-MCNC: 38 MG/DL (ref 40–75)
HDLC SERPL: 30.9 % (ref 20–50)
HGB BLD-MCNC: 14.1 G/DL (ref 14–18)
IMM GRANULOCYTES # BLD AUTO: 0.02 K/UL (ref 0–0.04)
IMM GRANULOCYTES NFR BLD AUTO: 0.5 % (ref 0–0.5)
LDLC SERPL CALC-MCNC: 69.6 MG/DL (ref 63–159)
LYMPHOCYTES # BLD AUTO: 0.8 K/UL (ref 1–4.8)
LYMPHOCYTES NFR BLD: 18.1 % (ref 18–48)
MAGNESIUM SERPL-MCNC: 2.2 MG/DL (ref 1.6–2.6)
MCH RBC QN AUTO: 22.4 PG (ref 27–31)
MCHC RBC AUTO-ENTMCNC: 33 G/DL (ref 32–36)
MCV RBC AUTO: 68 FL (ref 82–98)
MONOCYTES # BLD AUTO: 0.6 K/UL (ref 0.3–1)
MONOCYTES NFR BLD: 13.2 % (ref 4–15)
NEUTROPHILS # BLD AUTO: 2.8 K/UL (ref 1.8–7.7)
NEUTROPHILS NFR BLD: 65.1 % (ref 38–73)
NONHDLC SERPL-MCNC: 85 MG/DL
NRBC BLD-RTO: 0 /100 WBC
PLATELET # BLD AUTO: 188 K/UL (ref 150–350)
PMV BLD AUTO: ABNORMAL FL (ref 9.2–12.9)
POTASSIUM SERPL-SCNC: 4 MMOL/L (ref 3.5–5.1)
RBC # BLD AUTO: 6.29 M/UL (ref 4.6–6.2)
SODIUM SERPL-SCNC: 142 MMOL/L (ref 136–145)
TRIGL SERPL-MCNC: 77 MG/DL (ref 30–150)
TROPONIN I SERPL DL<=0.01 NG/ML-MCNC: 0.03 NG/ML (ref 0.02–0.04)
TROPONIN I SERPL DL<=0.01 NG/ML-MCNC: 0.04 NG/ML (ref 0.02–0.04)
TROPONIN I SERPL DL<=0.01 NG/ML-MCNC: 0.04 NG/ML (ref 0.02–0.04)
TROPONIN I SERPL DL<=0.01 NG/ML-MCNC: 0.05 NG/ML (ref 0.02–0.04)
TSH SERPL DL<=0.005 MIU/L-ACNC: 3.28 UIU/ML (ref 0.34–5.6)
WBC # BLD AUTO: 4.25 K/UL (ref 3.9–12.7)

## 2019-08-18 PROCEDURE — 36415 COLL VENOUS BLD VENIPUNCTURE: CPT

## 2019-08-18 PROCEDURE — 25000003 PHARM REV CODE 250: Performed by: NURSE PRACTITIONER

## 2019-08-18 PROCEDURE — 63600175 PHARM REV CODE 636 W HCPCS: Performed by: NURSE PRACTITIONER

## 2019-08-18 PROCEDURE — 20000000 HC ICU ROOM

## 2019-08-18 PROCEDURE — 83735 ASSAY OF MAGNESIUM: CPT

## 2019-08-18 PROCEDURE — 82550 ASSAY OF CK (CPK): CPT | Mod: 91

## 2019-08-18 PROCEDURE — 93005 ELECTROCARDIOGRAM TRACING: CPT

## 2019-08-18 PROCEDURE — 94761 N-INVAS EAR/PLS OXIMETRY MLT: CPT

## 2019-08-18 PROCEDURE — 80048 BASIC METABOLIC PNL TOTAL CA: CPT

## 2019-08-18 PROCEDURE — 80061 LIPID PANEL: CPT

## 2019-08-18 PROCEDURE — 25000003 PHARM REV CODE 250: Performed by: INTERNAL MEDICINE

## 2019-08-18 PROCEDURE — 84443 ASSAY THYROID STIM HORMONE: CPT

## 2019-08-18 PROCEDURE — 84484 ASSAY OF TROPONIN QUANT: CPT

## 2019-08-18 PROCEDURE — 63600175 PHARM REV CODE 636 W HCPCS: Performed by: INTERNAL MEDICINE

## 2019-08-18 PROCEDURE — 93306 TTE W/DOPPLER COMPLETE: CPT

## 2019-08-18 PROCEDURE — 82553 CREATINE MB FRACTION: CPT

## 2019-08-18 PROCEDURE — 85025 COMPLETE CBC W/AUTO DIFF WBC: CPT

## 2019-08-18 RX ORDER — AMIODARONE HYDROCHLORIDE 200 MG/1
200 TABLET ORAL DAILY
Status: DISCONTINUED | OUTPATIENT
Start: 2019-08-18 | End: 2019-08-18

## 2019-08-18 RX ORDER — FUROSEMIDE 10 MG/ML
40 INJECTION INTRAMUSCULAR; INTRAVENOUS
Status: COMPLETED | OUTPATIENT
Start: 2019-08-18 | End: 2019-08-19

## 2019-08-18 RX ORDER — NITROGLYCERIN 0.4 MG/1
0.4 TABLET SUBLINGUAL EVERY 5 MIN PRN
Status: DISCONTINUED | OUTPATIENT
Start: 2019-08-18 | End: 2019-08-21 | Stop reason: HOSPADM

## 2019-08-18 RX ORDER — AMIODARONE HYDROCHLORIDE 200 MG/1
200 TABLET ORAL 3 TIMES DAILY
Status: DISCONTINUED | OUTPATIENT
Start: 2019-08-18 | End: 2019-08-18

## 2019-08-18 RX ORDER — METOPROLOL SUCCINATE 50 MG/1
50 TABLET, EXTENDED RELEASE ORAL DAILY
Status: DISCONTINUED | OUTPATIENT
Start: 2019-08-18 | End: 2019-08-19

## 2019-08-18 RX ORDER — METOPROLOL TARTRATE 50 MG/1
50 TABLET ORAL 2 TIMES DAILY
Status: DISCONTINUED | OUTPATIENT
Start: 2019-08-18 | End: 2019-08-19

## 2019-08-18 RX ORDER — SODIUM,POTASSIUM PHOSPHATES 280-250MG
2 POWDER IN PACKET (EA) ORAL
Status: DISCONTINUED | OUTPATIENT
Start: 2019-08-18 | End: 2019-08-21 | Stop reason: HOSPADM

## 2019-08-18 RX ORDER — POTASSIUM CHLORIDE 20 MEQ/15ML
60 SOLUTION ORAL
Status: DISCONTINUED | OUTPATIENT
Start: 2019-08-18 | End: 2019-08-21 | Stop reason: HOSPADM

## 2019-08-18 RX ORDER — NAPROXEN SODIUM 220 MG/1
81 TABLET, FILM COATED ORAL DAILY
Status: DISCONTINUED | OUTPATIENT
Start: 2019-08-18 | End: 2019-08-21 | Stop reason: HOSPADM

## 2019-08-18 RX ORDER — POTASSIUM CHLORIDE 20 MEQ/15ML
40 SOLUTION ORAL
Status: DISCONTINUED | OUTPATIENT
Start: 2019-08-18 | End: 2019-08-21 | Stop reason: HOSPADM

## 2019-08-18 RX ORDER — LANOLIN ALCOHOL/MO/W.PET/CERES
800 CREAM (GRAM) TOPICAL
Status: DISCONTINUED | OUTPATIENT
Start: 2019-08-18 | End: 2019-08-21 | Stop reason: HOSPADM

## 2019-08-18 RX ORDER — AMIODARONE HYDROCHLORIDE 200 MG/1
200 TABLET ORAL 3 TIMES DAILY
Status: COMPLETED | OUTPATIENT
Start: 2019-08-18 | End: 2019-08-18

## 2019-08-18 RX ORDER — ACETAMINOPHEN 325 MG/1
650 TABLET ORAL EVERY 4 HOURS PRN
Status: DISCONTINUED | OUTPATIENT
Start: 2019-08-18 | End: 2019-08-21 | Stop reason: HOSPADM

## 2019-08-18 RX ORDER — SODIUM CHLORIDE 0.9 % (FLUSH) 0.9 %
10 SYRINGE (ML) INJECTION
Status: DISCONTINUED | OUTPATIENT
Start: 2019-08-18 | End: 2019-08-21 | Stop reason: HOSPADM

## 2019-08-18 RX ORDER — ATORVASTATIN CALCIUM 20 MG/1
20 TABLET, FILM COATED ORAL DAILY
Status: DISCONTINUED | OUTPATIENT
Start: 2019-08-18 | End: 2019-08-21 | Stop reason: HOSPADM

## 2019-08-18 RX ORDER — HYDRALAZINE HYDROCHLORIDE 20 MG/ML
10 INJECTION INTRAMUSCULAR; INTRAVENOUS EVERY 6 HOURS PRN
Status: DISCONTINUED | OUTPATIENT
Start: 2019-08-18 | End: 2019-08-21 | Stop reason: HOSPADM

## 2019-08-18 RX ORDER — ONDANSETRON 2 MG/ML
4 INJECTION INTRAMUSCULAR; INTRAVENOUS EVERY 8 HOURS PRN
Status: DISCONTINUED | OUTPATIENT
Start: 2019-08-18 | End: 2019-08-21 | Stop reason: HOSPADM

## 2019-08-18 RX ADMIN — MAGNESIUM SULFATE 2 G: 2 INJECTION INTRAVENOUS at 12:08

## 2019-08-18 RX ADMIN — ASPIRIN 81 MG 81 MG: 81 TABLET ORAL at 09:08

## 2019-08-18 RX ADMIN — FUROSEMIDE 40 MG: 10 INJECTION, SOLUTION INTRAMUSCULAR; INTRAVENOUS at 09:08

## 2019-08-18 RX ADMIN — AMIODARONE HYDROCHLORIDE 1 MG/MIN: 1.8 INJECTION, SOLUTION INTRAVENOUS at 06:08

## 2019-08-18 RX ADMIN — METOPROLOL TARTRATE 50 MG: 50 TABLET ORAL at 12:08

## 2019-08-18 RX ADMIN — METOPROLOL TARTRATE 50 MG: 50 TABLET ORAL at 08:08

## 2019-08-18 RX ADMIN — AMIODARONE HYDROCHLORIDE 200 MG: 200 TABLET ORAL at 11:08

## 2019-08-18 RX ADMIN — RIVAROXABAN 15 MG: 15 TABLET, FILM COATED ORAL at 04:08

## 2019-08-18 RX ADMIN — FUROSEMIDE 20 MG: 10 INJECTION, SOLUTION INTRAMUSCULAR; INTRAVENOUS at 08:08

## 2019-08-18 RX ADMIN — ATORVASTATIN CALCIUM 20 MG: 20 TABLET, FILM COATED ORAL at 09:08

## 2019-08-18 RX ADMIN — METOPROLOL SUCCINATE 50 MG: 50 TABLET, FILM COATED, EXTENDED RELEASE ORAL at 02:08

## 2019-08-18 RX ADMIN — HYDRALAZINE HYDROCHLORIDE 10 MG: 20 INJECTION INTRAMUSCULAR; INTRAVENOUS at 05:08

## 2019-08-18 RX ADMIN — AMIODARONE HYDROCHLORIDE 0.5 MG/MIN: 1.8 INJECTION, SOLUTION INTRAVENOUS at 05:08

## 2019-08-18 NOTE — ASSESSMENT & PLAN NOTE
And Vfib per [employer] AICD interrogation s/p 5 shocks today  Admit to ICU  Amiodarone gtt  Resume home meds  2 grams mag IV now then check level in AM   Consult Dr. Pate

## 2019-08-18 NOTE — HOSPITAL COURSE
Admitted to ICU on amiodarone infusion. Seen by Oscar HURD and Bernardo NELSON today already.   8/19 remains in ICU. Amiodarone infusion finished last PM. Received 1 dose po. Had 11 beat run ventricular tachycardia this AM (~1100 hours). Asymptomatic. Transferred to basic Cardiology. Had 20 second run of asymptomatic V-tach this AM. Feels well. Expect to discharge in AM.   8/21: multiple runs of non-sustained V-tach, despite increase of metoprolol. Diastolic BP has remained elevated. Remains asymptomatic. Discussed patient with Cardiology: discharge patient on current regimen; will be seen in clinic in 10 days, at which time EP evaluation will be arranged.

## 2019-08-18 NOTE — PLAN OF CARE
Problem: Fall Injury Risk  Goal: Absence of Fall and Fall-Related Injury  Outcome: Ongoing (interventions implemented as appropriate)  Safety features initiated, pt. Instructed how and when to use call lihgt

## 2019-08-18 NOTE — ASSESSMENT & PLAN NOTE
Likely from not taking his medications for 5 days   IV lasix x3 doses 12 hours apart  Accurate I&O  Daily wt   Repeat CXR in AM

## 2019-08-18 NOTE — H&P
[facility] Medicine  History & Physical    Patient Name: Surya Villagomez  MRN: [id number]  Admission Date: 8/17/2019  Attending Physician: Dr. Hatfield  Primary Care Provider: GENIE Bird         Patient information was obtained from patient and ER records.     Subjective:     Principal Problem:<principal problem not specified>    Chief Complaint:   Chief Complaint   Patient presents with    Chest Pain    Shortness of Breath        HPI: Mr. Villagomez presents today with complaints of chest pain. It is severe. It is associated with getting shocked by his AICD multiple times today, SOB, and 5 lb wt gain. He denies fever, chills, N/V/D, dizziness, or LOC. He states he ran out of his cardiac medicine and [facility] couldn't refill them until today so he hasn't had his medication for 5 days. He states he started feeling the pain around 11 am, but waited for his brother to get home with his medication and take that before he came in this afternoon. He has a history of dilated cardiomyopathy, EF 25%, persistent A. fib, hypertension, CKD3, elevated homocysteine levels, decreased factor XII activity, and coagulapathy, history of CVA with left hemiplegia since 2011, LHC from August 2018 by , showed severe OM disease which was stented with 1 BRADLEY, otherwise mild nonobstructive CAD. He was then placed on a lifevest and subsequently had a biventricular AICD placed. His last appt with Dr. Marmolejo was in Feb and at that time he was taken off of zaroxlyn and decreased his lasix d/t hypotension. He states he wants to establish care in here in [place]. In the ED, his AICD was interrogated and showed he received a total of 5 shocks today beginning at 5:49am, 6:45am, and 10:55 am.       Past Medical History:   Diagnosis Date    CHF (congestive heart failure)     Hypertension     Stroke     2011       Past Surgical History:   Procedure Laterality Date    INSERTION OF PACEMAKER         Review of  patient's allergies indicates:  No Known Allergies    No current facility-administered medications on file prior to encounter.      Current Outpatient Medications on File Prior to Encounter   Medication Sig    amiodarone (PACERONE) 200 MG Tab Take by mouth once daily.    aspirin-calcium carbonate 81 mg-300 mg calcium(777 mg) Tab Take 81 mg by mouth.    atorvastatin (LIPITOR) 20 MG tablet     clopidogrel (PLAVIX) 75 mg tablet TAKE 1 TABLET(75 MG) BY MOUTH DAILY    cyanocobalamin 1,000 mcg/mL injection ADMINISTER 1 ML(1000 MCG) IN THE MUSCLE EVERY 30 DAYS    fish oil-omega-3 fatty acids 300-1,000 mg capsule Take 1,040 mg by mouth.    furosemide (LASIX) 20 MG tablet TK 1 T PO  D    lisinopril (PRINIVIL,ZESTRIL) 5 MG tablet     metoprolol succinate (TOPROL-XL) 50 MG 24 hr tablet Take 75 mg by mouth.    nitroGLYCERIN (NITROSTAT) 0.4 MG SL tablet Place 0.4 mg under the tongue.    omega-3 acid ethyl esters (LOVAZA) 1 gram capsule Take 2 g by mouth.    rivaroxaban (XARELTO) 15 mg Tab Take 1 mL by mouth.     Family History     None        Tobacco Use    Smoking status: Former Smoker     Last attempt to quit: 5/10/2009     Years since quitting: 10.2    Smokeless tobacco: Never Used   Substance and Sexual Activity    Alcohol use: Not Currently    Drug use: Not Currently    Sexual activity: Not on file     Review of Systems   Constitutional: Positive for fatigue. Negative for chills, diaphoresis and fever.   HENT: Negative for congestion, ear pain, sore throat and trouble swallowing.    Eyes: Negative for pain, discharge and visual disturbance.   Respiratory: Positive for shortness of breath. Negative for cough, chest tightness and wheezing.    Cardiovascular: Positive for chest pain, palpitations and leg swelling.   Gastrointestinal: Negative for abdominal distention, abdominal pain, blood in stool, constipation, diarrhea, nausea and vomiting.   Endocrine: Negative for polydipsia, polyphagia and polyuria.    Genitourinary: Negative for dysuria, flank pain, frequency and urgency.   Musculoskeletal: Negative for back pain, joint swelling, neck pain and neck stiffness.   Skin: Negative for rash and wound.   Allergic/Immunologic: Negative for immunocompromised state.   Neurological: Negative for dizziness, syncope, speech difficulty, weakness, light-headedness, numbness and headaches.        Left side hemiplegia   Hematological: Negative for adenopathy.   Psychiatric/Behavioral: Negative for confusion and suicidal ideas. The patient is not nervous/anxious.    All other systems reviewed and are negative.    Objective:     Vital Signs (Most Recent):  Temp: 98.5 °F (36.9 °C) (08/17/19 2116)  Pulse: 96 (08/17/19 2341)  Resp: 20 (08/17/19 2341)  BP: (!) 150/102 (08/17/19 2341)  SpO2: 98 % (08/17/19 2341) Vital Signs (24h Range):  Temp:  [98.5 °F (36.9 °C)] 98.5 °F (36.9 °C)  Pulse:  [] 96  Resp:  [20] 20  SpO2:  [95 %-98 %] 98 %  BP: (141-150)/(102) 150/102     Weight: 88.5 kg (195 lb)  Body mass index is 27.2 kg/m².    Physical Exam   Constitutional: He is oriented to person, place, and time. He appears well-developed and well-nourished.   HENT:   Head: Normocephalic and atraumatic.   Eyes: Pupils are equal, round, and reactive to light. Conjunctivae and EOM are normal.   Neck: Normal range of motion. Neck supple.   Cardiovascular: Intact distal pulses.   Murmur heard.  Irregular rate and rhythm    Pulmonary/Chest: Effort normal.   Diminished in bilat bases with crackles in bilat bases   Abdominal: Soft. Bowel sounds are normal.   Musculoskeletal: He exhibits edema and deformity.   Left hand contracted   Neurological: He is alert and oriented to person, place, and time.   Left arm contacted, weakness left leg   Skin: Skin is warm and dry. Capillary refill takes less than 2 seconds.   Psychiatric: He has a normal mood and affect. His behavior is normal. Judgment and thought content normal.   Nursing note and vitals  reviewed.        CRANIAL NERVES     CN III, IV, VI   Pupils are equal, round, and reactive to light.  Extraocular motions are normal.        Significant Labs:   CBC:   Recent Labs   Lab 08/17/19 2146   WBC 4.64   HGB 14.4   HCT 45.2        CMP:   Recent Labs   Lab 08/17/19 2146      K 4.0      CO2 22*      BUN 31*   CREATININE 1.9*   CALCIUM 8.9   PROT 6.5   ALBUMIN 3.6   BILITOT 2.5*   ALKPHOS 63   AST 21   ALT 25   ANIONGAP 12   EGFRNONAA 37.2*     Cardiac Markers:   Recent Labs   Lab 08/17/19 2146   BNP 2,404*     Magnesium: No results for input(s): MG in the last 48 hours.    Significant Imaging: I have reviewed all pertinent imaging results/findings within the past 24 hours.   CXR: Bilat pulm congestion, cardiomegaly      Assessment/Plan:     Elevated troponin  Likely from heart failure and AICD shocks   Will trend      V-tach  And Vfib per [employer] AICD interrogation s/p 5 shocks today  Admit to ICU  Amiodarone gtt  Resume home meds  2 grams mag IV now then check level in AM   Consult Dr. Pate         Acute decompensated heart failure  Likely from not taking his medications for 5 days   IV lasix x3 doses 12 hours apart  Accurate I&O  Daily wt   Repeat CXR in AM         Chest pain  Likely from AICD shocks and heart failure - currently chest pain free  Resume home meds  Trend cards         Hypercoagulation syndrome  Per Dr. Matta, he recommends continued treatment with Xarelto and ASA, I have stopped the plavix based on these recommendations and will defer to cardiology if they would like to continue triple therapy        VTE Risk Mitigation (From admission, onward)    None             Dasia Moore, NP  Department of Hospital Medicine   [facility]

## 2019-08-18 NOTE — SUBJECTIVE & OBJECTIVE
"Interval History: feeling "better".    Review of Systems   Constitutional: Negative.    HENT: Negative.    Eyes: Negative.    Respiratory: Negative.    Cardiovascular: Negative.    Gastrointestinal: Negative.    Endocrine: Negative.    Genitourinary: Negative.    Musculoskeletal: Negative.    Skin: Negative.    Allergic/Immunologic: Negative.    Neurological:        Left arcelia   Hematological: Negative.    All other systems reviewed and are negative.    Objective:     Vital Signs (Most Recent):  Temp: 98.4 °F (36.9 °C) (08/18/19 0200)  Pulse: 77 (08/18/19 1000)  Resp: (!) 34 (08/18/19 1000)  BP: (!) 126/98 (08/18/19 1000)  SpO2: 99 % (08/18/19 1000) Vital Signs (24h Range):  Temp:  [98.4 °F (36.9 °C)-98.5 °F (36.9 °C)] 98.4 °F (36.9 °C)  Pulse:  [] 77  Resp:  [0-46] 34  SpO2:  [89 %-100 %] 99 %  BP: (114-152)/() 126/98     Weight: 88.5 kg (195 lb)  Body mass index is 27.2 kg/m².    Intake/Output Summary (Last 24 hours) at 8/18/2019 1059  Last data filed at 8/18/2019 1000  Gross per 24 hour   Intake 459.55 ml   Output 750 ml   Net -290.45 ml      Physical Exam   Constitutional: He is oriented to person, place, and time. He appears well-developed and well-nourished.   HENT:   Head: Normocephalic and atraumatic.   Mouth/Throat: Oropharynx is clear and moist.   Eyes: Pupils are equal, round, and reactive to light. Conjunctivae and EOM are normal.   Neck: Normal range of motion. Neck supple.   Cardiovascular: Normal rate, regular rhythm, normal heart sounds and intact distal pulses.   Pulmonary/Chest: Effort normal and breath sounds normal.   Abdominal: Soft. Bowel sounds are normal.   Musculoskeletal: Normal range of motion.   Neurological: He is alert and oriented to person, place, and time.   Left arcelia   Skin: Skin is warm and dry. Capillary refill takes less than 2 seconds.   Psychiatric: He has a normal mood and affect. His behavior is normal. Judgment and thought content normal.   Nursing note and vitals " reviewed.      Significant Labs:   CBC:   Recent Labs   Lab 08/17/19 2146 08/18/19  0607   WBC 4.64 4.25   HGB 14.4 14.1   HCT 45.2 42.7    188     CMP:   Recent Labs   Lab 08/17/19 2146 08/18/19  0607    142   K 4.0 4.0    109   CO2 22* 20*    123*   BUN 31* 32*   CREATININE 1.9* 1.7*   CALCIUM 8.9 8.9   PROT 6.5  --    ALBUMIN 3.6  --    BILITOT 2.5*  --    ALKPHOS 63  --    AST 21  --    ALT 25  --    ANIONGAP 12 13   EGFRNONAA 37.2* 42.6*     Cardiac Markers:   Recent Labs   Lab 08/17/19 2146   BNP 2,404*     Troponin:   Recent Labs   Lab 08/17/19 2146 08/18/19  0054 08/18/19  0608   TROPONINI 0.047* 0.046* 0.041*       Significant Imaging: I have reviewed all pertinent imaging results/findings within the past 24 hours.

## 2019-08-18 NOTE — CONSULTS
[facility]  Cardiology  Consult Note    Patient Name: Surya Villagomez  MRN: [id number]  Admission Date: 8/17/2019  Hospital Length of Stay: 1 days  Code Status: Full Code   Attending Provider: Demarcus Cobb MD   Consulting Provider: China Martinez NP  Primary Care Physician: GENIE Bird  Principal Problem:<principal problem not specified>    Patient information was obtained from patient, past medical records and ER records.     Inpatient consult to Cardiology  Consult performed by: Modesta Pate MD  Consult ordered by: Dasia Moore NP        Subjective:     REASON FOR CONSULT: chest pain    HPI:    Mr. Villagomez is a 59 year old male with past medical history significant for MI s/p PCI to OM1 with BRADLEY, CVA, ICD in situ, HFrEF, HTN, hyperlipidemia, and clotting disorder with DVT to LLE. He is followed by Dr. Marmolejo and Dr. Matta. He states that starting 3 days ago, he started feeling shocks from his ICD. He thinks it shocked him 3-5 times. He denies any increased shortness of breath at that time. States he is short of breath at baseline. He denies having any chest pain. Has not felt any palpitations. Denies any lightheadedness, dizziness, or syncope. Denies blood from the urine or stool. He does have a history of CVA in 2011 in which his left arm and leg are weak. He does walk with a cane. He has a strong family history of blood clots. He states in September of 2018, he did have an MI and recalls being told he had a reduced heart function at that time, but does not know his EF. He has continued to have runs of NSVT after starting on Amio drip around 11 o'clock last night.       Upon ICD interrogation, since January 2019- patient noted to have a total of  15 shock terminated VT/VF episodes, 3 failed. 14 episodes lasted over 30 seconds.    3 treated VF and 14 treated VT most recent on 8/17/2019 with total ATP x 8  Past Medical History:   Diagnosis Date    CHF (congestive heart  failure)     Hypertension     Stroke     2011       Past Surgical History:   Procedure Laterality Date    INSERTION OF PACEMAKER         Review of patient's allergies indicates:  No Known Allergies    No current facility-administered medications on file prior to encounter.      Current Outpatient Medications on File Prior to Encounter   Medication Sig    amiodarone (PACERONE) 200 MG Tab Take by mouth once daily.    aspirin-calcium carbonate 81 mg-300 mg calcium(777 mg) Tab Take 81 mg by mouth.    atorvastatin (LIPITOR) 20 MG tablet     clopidogrel (PLAVIX) 75 mg tablet TAKE 1 TABLET(75 MG) BY MOUTH DAILY    cyanocobalamin 1,000 mcg/mL injection ADMINISTER 1 ML(1000 MCG) IN THE MUSCLE EVERY 30 DAYS    fish oil-omega-3 fatty acids 300-1,000 mg capsule Take 1,040 mg by mouth.    furosemide (LASIX) 20 MG tablet TK 1 T PO  D    lisinopril (PRINIVIL,ZESTRIL) 5 MG tablet     metoprolol succinate (TOPROL-XL) 50 MG 24 hr tablet Take 75 mg by mouth.    nitroGLYCERIN (NITROSTAT) 0.4 MG SL tablet Place 0.4 mg under the tongue.    omega-3 acid ethyl esters (LOVAZA) 1 gram capsule Take 2 g by mouth.    rivaroxaban (XARELTO) 15 mg Tab Take 1 mL by mouth.       Scheduled Meds:   amiodarone  200 mg Oral Daily    aspirin  81 mg Oral Daily    atorvastatin  20 mg Oral Daily    furosemide (LASIX) IV  40 mg Intravenous Q12H    metoprolol succinate  50 mg Oral Daily    rivaroxaban  15 mg Oral with dinner     Continuous Infusions:   amiodarone in dextrose 5% 0.5 mg/min (08/18/19 0600)     PRN Meds:.acetaminophen, hydrALAZINE, magnesium oxide, magnesium oxide, nitroGLYCERIN, ondansetron, potassium chloride 10%, potassium chloride 10%, potassium chloride 10%, potassium, sodium phosphates, potassium, sodium phosphates, potassium, sodium phosphates, sodium chloride 0.9%    Family History     None          Tobacco Use    Smoking status: Former Smoker     Last attempt to quit: 5/10/2009     Years since quitting: 10.2     Smokeless tobacco: Never Used   Substance and Sexual Activity    Alcohol use: Not Currently    Drug use: Not Currently    Sexual activity: Not on file       ROS     No significant headaches or sore throat or runny nose.   No recent changes in vision.   No recent changes in hearing.  No dysphagia or odynophagia.  Reports shortness of breath at baseline. Denies chest pain other than soreness.  Denies any cough or hemoptysis.   Denies any abdominal pain, nausea, vomiting, diarrhea or constipation.   Denies any dysuria or polyuria.   Denies any fevers or chills.   Denies any recent significant weight changes.   Denies bleeding diathesis    CARDIOLOGY:    Ventricular paced, rate 80, frequent PVCs    Objective:     Vital Signs (Most Recent):  Temp: 98.4 °F (36.9 °C) (08/18/19 0200)  Pulse: 77 (08/18/19 1000)  Resp: (!) 34 (08/18/19 1000)  BP: (!) 126/98 (08/18/19 1000)  SpO2: 99 % (08/18/19 1000) Vital Signs (24h Range):  Temp:  [98.4 °F (36.9 °C)-98.5 °F (36.9 °C)] 98.4 °F (36.9 °C)  Pulse:  [] 77  Resp:  [0-46] 34  SpO2:  [89 %-100 %] 99 %  BP: (114-152)/() 126/98     Weight: 88.5 kg (195 lb)  Body mass index is 27.2 kg/m².    SpO2: 99 %  O2 Device (Oxygen Therapy): nasal cannula      Intake/Output Summary (Last 24 hours) at 8/18/2019 1101  Last data filed at 8/18/2019 1000  Gross per 24 hour   Intake 459.55 ml   Output 750 ml   Net -290.45 ml       Lines/Drains/Airways     Peripheral Intravenous Line                 Peripheral IV - Single Lumen 08/17/19 20 G Right Hand 1 day         Peripheral IV - Single Lumen 08/17/19 2140 20 G Right Antecubital less than 1 day                Physical Exam    HEENT: Normocephalic, atraumatic, PERRL, Conjunctiva pink, no scleral icterus.   CVS: S1S2+, RRR, no murmurs, rubs or gallops, JVP: Normal.  LUNGS: Fine crackles  ABDOMEN: Soft, NT, BS+  EXTREMITIES: No cyanosis, clubbing or edema. Left sided weakness.   NEURO: AAO X 3.       Significant Labs:   ABG: No results  for input(s): PH, PCO2, HCO3, POCSATURATED, BE in the last 48 hours., Blood Culture: No results for input(s): LABBLOO in the last 48 hours., BMP:   Recent Labs   Lab 08/17/19 2146 08/18/19  0607    123*    142   K 4.0 4.0    109   CO2 22* 20*   BUN 31* 32*   CREATININE 1.9* 1.7*   CALCIUM 8.9 8.9   MG  --  2.2   , CMP   Recent Labs   Lab 08/17/19 2146 08/18/19  0607    142   K 4.0 4.0    109   CO2 22* 20*    123*   BUN 31* 32*   CREATININE 1.9* 1.7*   CALCIUM 8.9 8.9   PROT 6.5  --    ALBUMIN 3.6  --    BILITOT 2.5*  --    ALKPHOS 63  --    AST 21  --    ALT 25  --    ANIONGAP 12 13   ESTGFRAFRICA 43.0* 49.2*   EGFRNONAA 37.2* 42.6*   , CBC   Recent Labs   Lab 08/17/19 2146 08/18/19  0607   WBC 4.64 4.25   HGB 14.4 14.1   HCT 45.2 42.7    188   , INR   Recent Labs   Lab 08/17/19 2146   INR 1.5   , Lipid Panel   Recent Labs   Lab 08/18/19  0607   CHOL 123   HDL 38*   LDLCALC 69.6   TRIG 77   CHOLHDL 30.9   , Troponin   Recent Labs   Lab 08/17/19 2146 08/18/19  0054 08/18/19  0608   TROPONINI 0.047* 0.046* 0.041*    and All pertinent lab results from the last 24 hours have been reviewed.    Significant Imaging: X-Ray: CXR: X-Ray Chest 1 View (CXR): No results found for this visit on 08/17/19.    -Ray Chest AP Portable [[id number]] Resulted: 08/18/19 0634   Order Status: Completed Updated: 08/18/19 0637   Narrative:     EXAMINATION:  XR CHEST AP PORTABLE    CLINICAL HISTORY:  Chest pain, unspecified    COMPARISON:  None available    FINDINGS:  Cardiac silhouette size is moderately enlarged.  Pacing/AICD leads are in place.  Prominence of central pulmonary vascularity.  Indistinctness of peripheral pulmonary vascularity and mildly increased perihilar interstitial markings.  No large pleural effusion or pneumothorax.   Impression:       Cardiomegaly with radiographic findings suggestive mild pulmonary edema.     LEFT HEART CATH 9/262019  RESULTS:  Patient had a very short  left main.  The left main was free of   disease.  Left main bifurcated into the LAD and circumflex.  The LAD was   aneurysmal in its proximal midportion.  There were several small diffusely   diseased diagonal branches.  The remainder of LAD is free of disease.  the   left circumflex is a codominant vessel that had a subtotal stenosis in the   mid first obtuse marginal.  The mid left circumflex had a 40% stenosis in   its midportion.  The right coronary artery had a 50% stenosis in its   midportion.  Left ventriculography was not performed.    CONCLUSIONS:  1.  Dilated cardiomyopathy.  2.  Successful angioplasty and stenting of the mid first obtuse marginal   with a drug-eluting stent.  3.  Sheath was removed using a Vascade closure device.         Assessment and Plan:     IMPRESSION:    Ventricular tachycardia. Multiple episodes. 210 runs of SVT since 1/2019. 9,588 VT episodes since 1/2019. On amio drip and metoprolol  Dilated cardiomyopathy. S/p ICD placement 9/2019 with  Dr. Marmolejo at [facility].   Atrial fibrillation.   ICD in situ. [employer] device.   CHF. Acute on chronic. BNP 2404.   Mildly elevated troponin. Likely related to ICD shocks. Not consistent with ACS.   Cad S/P PCI to first OM with BRADLEY 9/26/2019. Plavix recommended to be stopped by Dr. Matta.   Clotting disorder. Elevated homocysteine level and decreased Factor XII.. Left leg DVT on Xarelto/ASA. Followed by Dr. Matta.   CVA in 2011. Left leg weakness.   CKD.. Creatinine in 2018 was 1.83. 1.7 now.   HTN  Hyperlipidemia.     RECOMMENDATIONS:    1. Check echo.   2. Agree with continuing amiodarone drip.   3. Change metoprolol to 50 mg BID.   4. Check TSH.   5. Further decisions to be based upon hospital course.     Thank you for your consult. I will follow-up with patient. Please contact us if you have any additional questions.    China Martinez, VICKEY  Cardiology   [facility]      I have personally seen and examined the patient. I reviewed  the notes, assessments, and/or procedures performed by Ms China Martinez, I concur with her documentation of Surya Villagomez.

## 2019-08-18 NOTE — ASSESSMENT & PLAN NOTE
Per Dr. Matta, he recommends continued treatment with Xarelto and ASA, I have stopped the plavix based on these recommendations and will defer to cardiology if they would like to continue triple therapy

## 2019-08-18 NOTE — PLAN OF CARE
Problem: Adult Inpatient Plan of Care  Goal: Plan of Care Review  Outcome: Ongoing (interventions implemented as appropriate)  Pt. Has had frequent PVC. No rns of Vtach. Po Amiodarone  Increased frequency 200mg TID being started tonight when Amiodarone drip is finished.

## 2019-08-18 NOTE — ED TRIAGE NOTES
"Pt complains of upper chest wall pain for 2 days. Pt states that he took a "heart medication" prior to EMS arrival and EMS gave breathing treatment in route. Pt states pain free at this time. Pt was also complaining of shortness of breath and weakness. Pt states that he feels like his pacemaker has been "going off."  "

## 2019-08-18 NOTE — SUBJECTIVE & OBJECTIVE
Past Medical History:   Diagnosis Date    CHF (congestive heart failure)     Hypertension     Stroke     2011       Past Surgical History:   Procedure Laterality Date    INSERTION OF PACEMAKER         Review of patient's allergies indicates:  No Known Allergies    No current facility-administered medications on file prior to encounter.      Current Outpatient Medications on File Prior to Encounter   Medication Sig    amiodarone (PACERONE) 200 MG Tab Take by mouth once daily.    aspirin-calcium carbonate 81 mg-300 mg calcium(777 mg) Tab Take 81 mg by mouth.    atorvastatin (LIPITOR) 20 MG tablet     clopidogrel (PLAVIX) 75 mg tablet TAKE 1 TABLET(75 MG) BY MOUTH DAILY    cyanocobalamin 1,000 mcg/mL injection ADMINISTER 1 ML(1000 MCG) IN THE MUSCLE EVERY 30 DAYS    fish oil-omega-3 fatty acids 300-1,000 mg capsule Take 1,040 mg by mouth.    furosemide (LASIX) 20 MG tablet TK 1 T PO  D    lisinopril (PRINIVIL,ZESTRIL) 5 MG tablet     metoprolol succinate (TOPROL-XL) 50 MG 24 hr tablet Take 75 mg by mouth.    nitroGLYCERIN (NITROSTAT) 0.4 MG SL tablet Place 0.4 mg under the tongue.    omega-3 acid ethyl esters (LOVAZA) 1 gram capsule Take 2 g by mouth.    rivaroxaban (XARELTO) 15 mg Tab Take 1 mL by mouth.     Family History     None        Tobacco Use    Smoking status: Former Smoker     Last attempt to quit: 5/10/2009     Years since quitting: 10.2    Smokeless tobacco: Never Used   Substance and Sexual Activity    Alcohol use: Not Currently    Drug use: Not Currently    Sexual activity: Not on file     Review of Systems   Constitutional: Positive for fatigue. Negative for chills, diaphoresis and fever.   HENT: Negative for congestion, ear pain, sore throat and trouble swallowing.    Eyes: Negative for pain, discharge and visual disturbance.   Respiratory: Positive for shortness of breath. Negative for cough, chest tightness and wheezing.    Cardiovascular: Positive for chest pain, palpitations  and leg swelling.   Gastrointestinal: Negative for abdominal distention, abdominal pain, blood in stool, constipation, diarrhea, nausea and vomiting.   Endocrine: Negative for polydipsia, polyphagia and polyuria.   Genitourinary: Negative for dysuria, flank pain, frequency and urgency.   Musculoskeletal: Negative for back pain, joint swelling, neck pain and neck stiffness.   Skin: Negative for rash and wound.   Allergic/Immunologic: Negative for immunocompromised state.   Neurological: Negative for dizziness, syncope, speech difficulty, weakness, light-headedness, numbness and headaches.        Left side hemiplegia   Hematological: Negative for adenopathy.   Psychiatric/Behavioral: Negative for confusion and suicidal ideas. The patient is not nervous/anxious.    All other systems reviewed and are negative.    Objective:     Vital Signs (Most Recent):  Temp: 98.5 °F (36.9 °C) (08/17/19 2116)  Pulse: 96 (08/17/19 2341)  Resp: 20 (08/17/19 2341)  BP: (!) 150/102 (08/17/19 2341)  SpO2: 98 % (08/17/19 2341) Vital Signs (24h Range):  Temp:  [98.5 °F (36.9 °C)] 98.5 °F (36.9 °C)  Pulse:  [] 96  Resp:  [20] 20  SpO2:  [95 %-98 %] 98 %  BP: (141-150)/(102) 150/102     Weight: 88.5 kg (195 lb)  Body mass index is 27.2 kg/m².    Physical Exam   Constitutional: He is oriented to person, place, and time. He appears well-developed and well-nourished.   HENT:   Head: Normocephalic and atraumatic.   Eyes: Pupils are equal, round, and reactive to light. Conjunctivae and EOM are normal.   Neck: Normal range of motion. Neck supple.   Cardiovascular: Intact distal pulses.   Murmur heard.  Irregular rate and rhythm    Pulmonary/Chest: Effort normal.   Diminished in bilat bases with crackles in bilat bases   Abdominal: Soft. Bowel sounds are normal.   Musculoskeletal: He exhibits edema and deformity.   Left hand contracted   Neurological: He is alert and oriented to person, place, and time.   Left arm contacted, weakness left leg    Skin: Skin is warm and dry. Capillary refill takes less than 2 seconds.   Psychiatric: He has a normal mood and affect. His behavior is normal. Judgment and thought content normal.   Nursing note and vitals reviewed.        CRANIAL NERVES     CN III, IV, VI   Pupils are equal, round, and reactive to light.  Extraocular motions are normal.        Significant Labs:   CBC:   Recent Labs   Lab 08/17/19 2146   WBC 4.64   HGB 14.4   HCT 45.2        CMP:   Recent Labs   Lab 08/17/19 2146      K 4.0      CO2 22*      BUN 31*   CREATININE 1.9*   CALCIUM 8.9   PROT 6.5   ALBUMIN 3.6   BILITOT 2.5*   ALKPHOS 63   AST 21   ALT 25   ANIONGAP 12   EGFRNONAA 37.2*     Cardiac Markers:   Recent Labs   Lab 08/17/19 2146   BNP 2,404*     Magnesium: No results for input(s): MG in the last 48 hours.    Significant Imaging: I have reviewed all pertinent imaging results/findings within the past 24 hours.   CXR: Bilat pulm congestion, cardiomegaly

## 2019-08-18 NOTE — PROGRESS NOTES
"[facility]  Progress Note    Patient Name: Surya Villagomez  MRN: [id number]  Patient Class: IP- Inpatient   Admission Date: 8/17/2019  Length of Stay: 1 days  Attending Physician: Demarcus Cobb MD  Primary Care Provider: GENIE Bird        Subjective:     Principal Problem:V-tach        HPI:  Mr. Villagomez presents today with complaints of chest pain. It is severe. It is associated with getting shocked by his AICD multiple times today, SOB, and 5 lb wt gain. He denies fever, chills, N/V/D, dizziness, or LOC. He states he ran out of his cardiac medicine and [facility] couldn't refill them until today so he hasn't had his medication for 5 days. He states he started feeling the pain around 11 am, but waited for his brother to get home with his medication and take that before he came in this afternoon. He has a history of dilated cardiomyopathy, EF 25%, persistent A. fib, hypertension, CKD3, elevated homocysteine levels, decreased factor XII activity, and coagulapathy, history of CVA with left hemiplegia since 2011, LHC from August 2018 by , showed severe OM disease which was stented with 1 BRADLEY, otherwise mild nonobstructive CAD. He was then placed on a lifevest and subsequently had a biventricular AICD placed. His last appt with Dr. Marmolejo was in Feb and at that time he was taken off of zaroxlyn and decreased his lasix d/t hypotension. He states he wants to establish care in here in [place]. In the ED, his AICD was interrogated and showed he received a total of 5 shocks today beginning at 5:49am, 6:45am, and 10:55 am.       Overview/Hospital Course:  Admitted to ICU on amiodarone infusion. Seen by Oscar HURD and Bernardo NELSON today already.     Interval History: feeling "better".    Review of Systems   Constitutional: Negative.    HENT: Negative.    Eyes: Negative.    Respiratory: Negative.    Cardiovascular: Negative.    Gastrointestinal: Negative.    Endocrine: Negative.  "   Genitourinary: Negative.    Musculoskeletal: Negative.    Skin: Negative.    Allergic/Immunologic: Negative.    Neurological:        Left arcelia   Hematological: Negative.    All other systems reviewed and are negative.    Objective:     Vital Signs (Most Recent):  Temp: 98.4 °F (36.9 °C) (08/18/19 0200)  Pulse: 77 (08/18/19 1000)  Resp: (!) 34 (08/18/19 1000)  BP: (!) 126/98 (08/18/19 1000)  SpO2: 99 % (08/18/19 1000) Vital Signs (24h Range):  Temp:  [98.4 °F (36.9 °C)-98.5 °F (36.9 °C)] 98.4 °F (36.9 °C)  Pulse:  [] 77  Resp:  [0-46] 34  SpO2:  [89 %-100 %] 99 %  BP: (114-152)/() 126/98     Weight: 88.5 kg (195 lb)  Body mass index is 27.2 kg/m².    Intake/Output Summary (Last 24 hours) at 8/18/2019 1059  Last data filed at 8/18/2019 1000  Gross per 24 hour   Intake 459.55 ml   Output 750 ml   Net -290.45 ml      Physical Exam   Constitutional: He is oriented to person, place, and time. He appears well-developed and well-nourished.   HENT:   Head: Normocephalic and atraumatic.   Mouth/Throat: Oropharynx is clear and moist.   Eyes: Pupils are equal, round, and reactive to light. Conjunctivae and EOM are normal.   Neck: Normal range of motion. Neck supple.   Cardiovascular: Normal rate, regular rhythm, normal heart sounds and intact distal pulses.   Pulmonary/Chest: Effort normal and breath sounds normal.   Abdominal: Soft. Bowel sounds are normal.   Musculoskeletal: Normal range of motion.   Neurological: He is alert and oriented to person, place, and time.   Left arcelia   Skin: Skin is warm and dry. Capillary refill takes less than 2 seconds.   Psychiatric: He has a normal mood and affect. His behavior is normal. Judgment and thought content normal.   Nursing note and vitals reviewed.      Significant Labs:   CBC:   Recent Labs   Lab 08/17/19  2146 08/18/19  0607   WBC 4.64 4.25   HGB 14.4 14.1   HCT 45.2 42.7    188     CMP:   Recent Labs   Lab 08/17/19  2146 08/18/19  0607    142   K 4.0  4.0    109   CO2 22* 20*    123*   BUN 31* 32*   CREATININE 1.9* 1.7*   CALCIUM 8.9 8.9   PROT 6.5  --    ALBUMIN 3.6  --    BILITOT 2.5*  --    ALKPHOS 63  --    AST 21  --    ALT 25  --    ANIONGAP 12 13   EGFRNONAA 37.2* 42.6*     Cardiac Markers:   Recent Labs   Lab 08/17/19 2146   BNP 2,404*     Troponin:   Recent Labs   Lab 08/17/19  2146 08/18/19  0054 08/18/19  0608   TROPONINI 0.047* 0.046* 0.041*       Significant Imaging: I have reviewed all pertinent imaging results/findings within the past 24 hours.      Assessment/Plan:      * V-tach  And Vfib per [employer] AICD interrogation s/p 5 shocks today  Admit to ICU  Amiodarone gtt  Resume home meds  2 grams mag IV now then check level in AM   Consult Dr. Pate         Elevated troponin  Likely from heart failure and AICD shocks   Will trend      Acute decompensated heart failure  Likely from not taking his medications for 5 days   IV lasix x3 doses 12 hours apart  Accurate I&O  Daily wt   Repeat CXR in AM         Chest pain  Likely from AICD shocks and heart failure - currently chest pain free  Resume home meds  Trend cards         Hypercoagulation syndrome  Per Dr. Matta, he recommends continued treatment with Xarelto and ASA, I have stopped the plavix based on these recommendations and will defer to cardiology if they would like to continue triple therapy        VTE Risk Mitigation (From admission, onward)        Ordered     rivaroxaban tablet 15 mg  with dinner      08/18/19 0117     IP VTE HIGH RISK PATIENT  Once      08/18/19 0117     Reason for No Pharmacological VTE Prophylaxis  Once      08/18/19 0117                Demarcus Cobb MD  Department of Hospital Medicine   [facility]

## 2019-08-18 NOTE — ED PROVIDER NOTES
Encounter Date: 8/17/2019       History     Chief Complaint   Patient presents with    Chest Pain    Shortness of Breath     HPI     59-year-old male with past medical history of CHF, hypertension, CVA in 2011, DVT on Xarelto, CAD status post MI with stent placed in August of 2018, cardiomyopathy with ICD in place who presents with left-sided chest pain and the sensation that his pacemaker was firing.  Patient states that his chest pain began around 11:00 a.m. this morning.  He describes it as a heavy like sensation that does not radiate.  He states he was walking his living room when the chest pain began.  He then noticed what felt like a shock from his ICD around noon today.  He did not come to the ED right away because he thought if he took his heart feels which he does not know the name of at this time, the pain in the front of the ICD would get better.  His chest pain is resolved at this time.  His cardiologist is Dr. Marmolejo in [place], [place].  He denies diaphoresis, shortness of breath, nausea vomiting, abdominal pain, weakness or syncope.    Review of patient's allergies indicates:  No Known Allergies  Past Medical History:   Diagnosis Date    CHF (congestive heart failure)     Hypertension     Stroke     2011     Past Surgical History:   Procedure Laterality Date    INSERTION OF PACEMAKER       No family history on file.  Social History     Tobacco Use    Smoking status: Former Smoker     Last attempt to quit: 5/10/2009     Years since quitting: 10.2    Smokeless tobacco: Never Used   Substance Use Topics    Alcohol use: Not Currently    Drug use: Not Currently     Review of Systems   Constitutional: Negative for chills and fever.   HENT: Negative for drooling.    Eyes: Negative for visual disturbance.   Respiratory: Negative for shortness of breath.    Cardiovascular: Positive for chest pain.   Gastrointestinal: Negative for abdominal pain, nausea and vomiting.   Genitourinary: Negative  for dysuria.   Musculoskeletal: Negative for back pain and neck pain.   Neurological: Negative for syncope, weakness, numbness and headaches.   Psychiatric/Behavioral: Negative for confusion.       Physical Exam     Initial Vitals [08/17/19 2116]   BP Pulse Resp Temp SpO2   -- 106 20 98.5 °F (36.9 °C) 98 %      MAP       --         Physical Exam    Constitutional:  Non-toxic appearance. He does not appear ill. No distress.   HENT:   Head: Normocephalic and atraumatic.   Eyes: EOM are normal. Pupils are equal, round, and reactive to light.   Neck: Normal range of motion. Neck supple. No JVD present.   Cardiovascular: Regular rhythm and intact distal pulses. Tachycardia present.  Exam reveals no gallop and no decreased pulses.    No murmur heard.The patient has a device (subcutaneous AICD) in the left chest.   Pulmonary/Chest: Effort normal. No respiratory distress. He has decreased breath sounds in the right lower field and the left lower field. He has no wheezes. He has no rhonchi. He has no rales.   Abdominal: Soft. Bowel sounds are normal. He exhibits no distension, no ascites and no mass. There is no splenomegaly or hepatomegaly. There is no tenderness. There is no rebound and no guarding.   Musculoskeletal:   Left upper extremity contracture   Neurological: He is alert.   Skin: Skin is warm and dry.       HO-IV MDM  This is an emergent evaluation of a 59-year-old male with past medical history of CHF, hypertension, CVA in 2011, DVT on Xarelto, CAD status post MI with stent placed in August of 2018, cardiomyopathy with ICD in place who presents with left-sided chest pain and the sensation that his pacemaker was firing.  On presentation blood pressure 141/102.  Remainder of vitals within normal limits. Physical exam notable for nontoxic-appearing male in no acute distress. AICD in the left upper chest wall.  Heart regular rhythm with tachycardia at a rate of 108 beats per minute, no murmurs, gallops or rubs. Lungs  with decreased breath sounds in bilateral lower lung fields, no wheeze, rales or rhonchi.  There is a contracture to the left upper extremity.  Remainder of exam benign. DDx includes but is not limited to arrhythmia, ACS, CHF exacerbation.  We also consider however clinically less likely to be aortic dissection, PE, pneumonia, pneumothorax. Will obtain labs and imaging including CBC, CMP, troponin, BNP, chest x-ray, EKG.  Initial EKG showed a ventricular paced rhythm with a rate of 96 beats per minute, normal axis and interval.  No old EKG in epic to compare.  While obtaining history and performed physical, patient began to have what appeared to be ventricular tachycardia  Will also provide pain medication as need. Will continue to monitor and frequently reassess pending results of labs, treatments and final disposition. Case discussed with Dr. London    Patient is aware of plan and is amenable.     Priscila Perry D.O  \[facility]\"" EMERGENCY MEDICINE PGY-4  9:44 PM 08/17/2019    HO-IV UPDATE  Patient's AICD interrogated and revealed that patient had 15 shocks since January of 2019, with 5 of those events occurred today as a result of ventricular fibrillation/ventricular tachycardia. The most recent shock was administered at 10:45 a.m..  Given this interpretation in the runs of [place] while an apartment, patient was started on amiodarone.  Labs remarkable for a BUN of 31 and creatinine of 1.9.  A troponin of 0.047.  BNP of 2404.  Patient was discussed with hospital medicine for admission.    Priscila Perry D.O  \[facility]\"" EMERGENCY MEDICINE PGY-4  11:02 PM 08/17/2019    HO-IV UPDATE  Patient evaluated by Hospital Medicine who recommended consultation cardiology.  Spoke with Dr. Miller.  No new recommendations given a patient admitted to the ICU.  He remained hemodynamically stable while in our department.    Priscila Perry D.O  \[facility]\"" EMERGENCY MEDICINE PGY-4  11:38 PM 08/17/2019    ED Course   Procedures  Labs  Reviewed   CBC W/ AUTO DIFFERENTIAL   COMPREHENSIVE METABOLIC PANEL   B-TYPE NATRIURETIC PEPTIDE   TROPONIN I   PROTIME-INR          Imaging Results    None                               Clinical Impression:       ICD-10-CM ICD-9-CM   1. Chest pain R07.9 786.50   2. Congestive heart failure, unspecified HF chronicity, unspecified heart failure type I50.9 428.0   3. CROW (acute kidney injury) N17.9 584.9   4. Arrhythmia I49.9 427.9                                Priscila Perry, DO  Resident  08/18/19 0634

## 2019-08-18 NOTE — ASSESSMENT & PLAN NOTE
Likely from AICD shocks and heart failure - currently chest pain free  Resume home meds  Trend cards

## 2019-08-19 PROBLEM — I47.20 VENTRICULAR TACHYCARDIA: Status: ACTIVE | Noted: 2019-08-19

## 2019-08-19 LAB
ANION GAP SERPL CALC-SCNC: 13 MMOL/L (ref 8–16)
ANION GAP SERPL CALC-SCNC: 18 MMOL/L (ref 8–16)
BASOPHILS # BLD AUTO: 0.02 K/UL (ref 0–0.2)
BASOPHILS NFR BLD: 0.5 % (ref 0–1.9)
BUN SERPL-MCNC: 30 MG/DL (ref 6–30)
BUN SERPL-MCNC: 31 MG/DL (ref 8–23)
CALCIUM SERPL-MCNC: 9 MG/DL (ref 8.7–10.5)
CHLORIDE SERPL-SCNC: 105 MMOL/L (ref 95–110)
CHLORIDE SERPL-SCNC: 108 MMOL/L (ref 95–110)
CK MB SERPL-MCNC: 3.3 NG/ML (ref 0.1–6.5)
CK MB SERPL-MCNC: 3.7 NG/ML (ref 0.1–6.5)
CK MB SERPL-MCNC: 3.8 NG/ML (ref 0.1–6.5)
CK SERPL-CCNC: 101 U/L (ref 20–200)
CK SERPL-CCNC: 103 U/L (ref 20–200)
CK SERPL-CCNC: 118 U/L (ref 20–200)
CO2 SERPL-SCNC: 23 MMOL/L (ref 23–29)
CREAT SERPL-MCNC: 1.6 MG/DL (ref 0.5–1.4)
CREAT SERPL-MCNC: 1.9 MG/DL (ref 0.5–1.4)
DIFFERENTIAL METHOD: ABNORMAL
EOSINOPHIL # BLD AUTO: 0.2 K/UL (ref 0–0.5)
EOSINOPHIL NFR BLD: 4.8 % (ref 0–8)
ERYTHROCYTE [DISTWIDTH] IN BLOOD BY AUTOMATED COUNT: 19.3 % (ref 11.5–14.5)
EST. GFR  (AFRICAN AMERICAN): 53 ML/MIN/1.73 M^2
EST. GFR  (NON AFRICAN AMERICAN): 45.8 ML/MIN/1.73 M^2
GLUCOSE SERPL-MCNC: 100 MG/DL (ref 70–110)
GLUCOSE SERPL-MCNC: 96 MG/DL (ref 70–110)
HCT VFR BLD AUTO: 44.3 % (ref 40–54)
HCT VFR BLD CALC: 48 %PCV (ref 36–54)
HGB BLD-MCNC: 14.3 G/DL (ref 14–18)
IMM GRANULOCYTES # BLD AUTO: 0.02 K/UL (ref 0–0.04)
IMM GRANULOCYTES NFR BLD AUTO: 0.5 % (ref 0–0.5)
LYMPHOCYTES # BLD AUTO: 0.9 K/UL (ref 1–4.8)
LYMPHOCYTES NFR BLD: 21.3 % (ref 18–48)
MAGNESIUM SERPL-MCNC: 2.1 MG/DL (ref 1.6–2.6)
MCH RBC QN AUTO: 21.7 PG (ref 27–31)
MCHC RBC AUTO-ENTMCNC: 32.3 G/DL (ref 32–36)
MCV RBC AUTO: 67 FL (ref 82–98)
MONOCYTES # BLD AUTO: 0.7 K/UL (ref 0.3–1)
MONOCYTES NFR BLD: 14.9 % (ref 4–15)
NEUTROPHILS # BLD AUTO: 2.5 K/UL (ref 1.8–7.7)
NEUTROPHILS NFR BLD: 58 % (ref 38–73)
NRBC BLD-RTO: 0 /100 WBC
PLATELET # BLD AUTO: 213 K/UL (ref 150–350)
PMV BLD AUTO: ABNORMAL FL (ref 9.2–12.9)
POC IONIZED CALCIUM: 1.09 MMOL/L (ref 1.06–1.42)
POC TCO2 (MEASURED): 22 MMOL/L (ref 23–29)
POTASSIUM BLD-SCNC: 4 MMOL/L (ref 3.5–5.1)
POTASSIUM SERPL-SCNC: 3.6 MMOL/L (ref 3.5–5.1)
POTASSIUM SERPL-SCNC: 4.2 MMOL/L (ref 3.5–5.1)
RBC # BLD AUTO: 6.59 M/UL (ref 4.6–6.2)
SAMPLE: ABNORMAL
SODIUM BLD-SCNC: 142 MMOL/L (ref 136–145)
SODIUM SERPL-SCNC: 141 MMOL/L (ref 136–145)
TROPONIN I SERPL DL<=0.01 NG/ML-MCNC: 0.04 NG/ML (ref 0.02–0.04)
WBC # BLD AUTO: 4.36 K/UL (ref 3.9–12.7)

## 2019-08-19 PROCEDURE — 84484 ASSAY OF TROPONIN QUANT: CPT

## 2019-08-19 PROCEDURE — 94761 N-INVAS EAR/PLS OXIMETRY MLT: CPT

## 2019-08-19 PROCEDURE — 84132 ASSAY OF SERUM POTASSIUM: CPT

## 2019-08-19 PROCEDURE — 80048 BASIC METABOLIC PNL TOTAL CA: CPT

## 2019-08-19 PROCEDURE — 25000003 PHARM REV CODE 250: Performed by: NURSE PRACTITIONER

## 2019-08-19 PROCEDURE — 82550 ASSAY OF CK (CPK): CPT

## 2019-08-19 PROCEDURE — 82553 CREATINE MB FRACTION: CPT

## 2019-08-19 PROCEDURE — 84484 ASSAY OF TROPONIN QUANT: CPT | Mod: 91

## 2019-08-19 PROCEDURE — 63600175 PHARM REV CODE 636 W HCPCS: Performed by: NURSE PRACTITIONER

## 2019-08-19 PROCEDURE — 93005 ELECTROCARDIOGRAM TRACING: CPT

## 2019-08-19 PROCEDURE — 25000003 PHARM REV CODE 250: Performed by: INTERNAL MEDICINE

## 2019-08-19 PROCEDURE — 25000003 PHARM REV CODE 250

## 2019-08-19 PROCEDURE — 83735 ASSAY OF MAGNESIUM: CPT

## 2019-08-19 PROCEDURE — 85025 COMPLETE CBC W/AUTO DIFF WBC: CPT

## 2019-08-19 PROCEDURE — 21400001 HC TELEMETRY ROOM

## 2019-08-19 PROCEDURE — 82550 ASSAY OF CK (CPK): CPT | Mod: 91

## 2019-08-19 PROCEDURE — 82553 CREATINE MB FRACTION: CPT | Mod: 91

## 2019-08-19 PROCEDURE — 36415 COLL VENOUS BLD VENIPUNCTURE: CPT

## 2019-08-19 RX ORDER — AMIODARONE HYDROCHLORIDE 200 MG/1
400 TABLET ORAL 2 TIMES DAILY
Status: DISCONTINUED | OUTPATIENT
Start: 2019-08-19 | End: 2019-08-19

## 2019-08-19 RX ORDER — AMIODARONE HYDROCHLORIDE 200 MG/1
200 TABLET ORAL 3 TIMES DAILY
Status: DISCONTINUED | OUTPATIENT
Start: 2019-08-19 | End: 2019-08-19

## 2019-08-19 RX ORDER — METOPROLOL SUCCINATE 50 MG/1
50 TABLET, EXTENDED RELEASE ORAL DAILY
Status: DISCONTINUED | OUTPATIENT
Start: 2019-08-19 | End: 2019-08-20

## 2019-08-19 RX ORDER — MUPIROCIN 20 MG/G
OINTMENT TOPICAL 2 TIMES DAILY
Status: DISCONTINUED | OUTPATIENT
Start: 2019-08-19 | End: 2019-08-21 | Stop reason: HOSPADM

## 2019-08-19 RX ORDER — AMIODARONE HYDROCHLORIDE 200 MG/1
400 TABLET ORAL 2 TIMES DAILY
Status: DISCONTINUED | OUTPATIENT
Start: 2019-08-20 | End: 2019-08-21 | Stop reason: HOSPADM

## 2019-08-19 RX ORDER — CHLORHEXIDINE GLUCONATE ORAL RINSE 1.2 MG/ML
15 SOLUTION DENTAL 2 TIMES DAILY
Status: DISCONTINUED | OUTPATIENT
Start: 2019-08-19 | End: 2019-08-21 | Stop reason: HOSPADM

## 2019-08-19 RX ADMIN — MUPIROCIN: 20 OINTMENT TOPICAL at 12:08

## 2019-08-19 RX ADMIN — AMIODARONE HYDROCHLORIDE 200 MG: 200 TABLET ORAL at 11:08

## 2019-08-19 RX ADMIN — ASPIRIN 81 MG 81 MG: 81 TABLET ORAL at 09:08

## 2019-08-19 RX ADMIN — FUROSEMIDE 40 MG: 10 INJECTION, SOLUTION INTRAMUSCULAR; INTRAVENOUS at 09:08

## 2019-08-19 RX ADMIN — AMIODARONE HYDROCHLORIDE 400 MG: 200 TABLET ORAL at 09:08

## 2019-08-19 RX ADMIN — RIVAROXABAN 15 MG: 15 TABLET, FILM COATED ORAL at 05:08

## 2019-08-19 RX ADMIN — ATORVASTATIN CALCIUM 20 MG: 20 TABLET, FILM COATED ORAL at 09:08

## 2019-08-19 RX ADMIN — METOPROLOL TARTRATE 50 MG: 50 TABLET ORAL at 09:08

## 2019-08-19 RX ADMIN — METOPROLOL SUCCINATE 50 MG: 50 TABLET, FILM COATED, EXTENDED RELEASE ORAL at 09:08

## 2019-08-19 RX ADMIN — CHLORHEXIDINE GLUCONATE 15 ML: 1.2 RINSE ORAL at 09:08

## 2019-08-19 RX ADMIN — POTASSIUM CHLORIDE 40 MEQ: 20 SOLUTION ORAL at 09:08

## 2019-08-19 NOTE — SUBJECTIVE & OBJECTIVE
"Interval History: feels "better"    Review of Systems   Constitutional: Negative.    HENT: Negative.    Eyes: Negative.    Respiratory: Negative.    Cardiovascular: Negative.    Gastrointestinal: Negative.    Endocrine: Negative.    Genitourinary: Negative.    Musculoskeletal: Negative.    Skin: Negative.    Allergic/Immunologic: Negative.    Neurological: Negative.    Hematological: Negative.    All other systems reviewed and are negative.    Objective:     Vital Signs (Most Recent):  Temp: 98.2 °F (36.8 °C) (08/19/19 0701)  Pulse: 74 (08/19/19 1000)  Resp: (!) 32 (08/19/19 1000)  BP: 109/88 (08/19/19 1000)  SpO2: 98 % (08/19/19 1000) Vital Signs (24h Range):  Temp:  [97.9 °F (36.6 °C)-99.2 °F (37.3 °C)] 98.2 °F (36.8 °C)  Pulse:  [72-92] 74  Resp:  [11-44] 32  SpO2:  [80 %-99 %] 98 %  BP: (109-155)/() 109/88     Weight: 88.5 kg (195 lb 1.7 oz)  Body mass index is 27.21 kg/m².    Intake/Output Summary (Last 24 hours) at 8/19/2019 1200  Last data filed at 8/19/2019 0600  Gross per 24 hour   Intake 303.7 ml   Output 1650 ml   Net -1346.3 ml      Physical Exam   Constitutional: He is oriented to person, place, and time. He appears well-developed and well-nourished.   HENT:   Head: Normocephalic and atraumatic.   Eyes: Pupils are equal, round, and reactive to light. Conjunctivae and EOM are normal.   Neck: Normal range of motion. Neck supple.   Cardiovascular: Normal rate, regular rhythm, normal heart sounds and intact distal pulses.   Pulmonary/Chest: Effort normal and breath sounds normal.   Abdominal: Soft. Bowel sounds are normal.   Musculoskeletal: Normal range of motion.   Neurological: He is alert and oriented to person, place, and time.   Left arcelia   Skin: Skin is warm and dry. Capillary refill takes less than 2 seconds.   Psychiatric: He has a normal mood and affect. His behavior is normal. Judgment and thought content normal.   Nursing note and vitals reviewed.      Significant Labs:   BMP:   Recent " Labs   Lab 08/19/19  0614   GLU 96      K 3.6      CO2 23   BUN 31*   CREATININE 1.6*   CALCIUM 9.0   MG 2.1     CBC:   Recent Labs   Lab 08/17/19  2146 08/17/19  2147 08/18/19  0607 08/19/19  0614   WBC 4.64  --  4.25 4.36   HGB 14.4  --  14.1 14.3   HCT 45.2 48 42.7 44.3     --  188 213       Significant Imaging: I have reviewed all pertinent imaging results/findings within the past 24 hours.

## 2019-08-19 NOTE — PLAN OF CARE
08/19/19 1211   Discharge Assessment   Assessment Type Discharge Planning Assessment   Confirmed/corrected address and phone number on facesheet? Yes   Assessment information obtained from? Patient   Communicated expected length of stay with patient/caregiver no   Prior to hospitilization cognitive status: Alert/Oriented   Prior to hospitalization functional status: Independent   Current cognitive status: Alert/Oriented   Lives With alone   Able to Return to Prior Arrangements yes   Is patient able to care for self after discharge? Yes   Readmission Within the Last 30 Days no previous admission in last 30 days   Patient currently being followed by outpatient case management? No   Patient currently receives any other outside agency services? Yes   Name and contact number of agency or person providing outside services Pt has a caregiver Aubrie Love (sp?) who comes 3x/week to help with cleaning and bringing food   Is it the patient/care giver preference to resume care with the current outside agency? Yes   Equipment Currently Used at Home shower chair   Do you have any problems affording any of your prescribed medications? No   Is the patient taking medications as prescribed? yes   Does the patient have transportation home? Yes   Transportation Anticipated family or friend will provide   Discharge Plan A Home   DME Needed Upon Discharge  none     Pt has no needs at this time.

## 2019-08-19 NOTE — PROGRESS NOTES
Dr. Pate and Dr. Cobb aware of VTACH episode from 1115 today. See physical chart for monitor strips. No new orders given, will continue with Amiodarone 200mg TID. Will monitor pt closely.

## 2019-08-19 NOTE — CARE UPDATE
08/18/19 2330   Patient Assessment/Suction   Level of Consciousness (AVPU) alert   Respiratory Effort Unlabored   Expansion/Accessory Muscles/Retractions no use of accessory muscles   All Lung Fields Breath Sounds clear;diminished   Rhythm/Pattern, Respiratory no shortness of breath reported   Cough Frequency infrequent   Cough Type dry   PRE-TX-O2   O2 Device (Oxygen Therapy) room air   SpO2 (!) 84 %   Pulse Oximetry Type Continuous   Pulse 75   Resp (!) 26   /88

## 2019-08-19 NOTE — PLAN OF CARE
Problem: Adult Inpatient Plan of Care  Goal: Plan of Care Review  Outcome: Ongoing (interventions implemented as appropriate)  Had a few runs of VTACH at 1115 this AM. Dr. Pate aware. Amiodarone PO changed to 400mg BID, first dose tonight at 2100. VSS. V-paced on monitor. No complaints today from patient. Denies SOB or chest pain. Dr. Pate ok for pt to be transferred out of ICU. Has bed on Cardiology B. Will be transferred today per Dr. Cobb's orders.   Goal: Absence of Hospital-Acquired Illness or Injury  Outcome: Ongoing (interventions implemented as appropriate)  No s/s of injury at this time.   Intervention: Prevent Skin Injury  No skin injury at this time.   Intervention: Prevent VTE (venous thromboembolism)  SCD on right leg only due to clot in left leg  Intervention: Prevent Infection  No s/s of infection at this time.     Goal: Optimal Comfort and Wellbeing  Pt denies any pain today.   Intervention: Monitor Pain and Promote Comfort  Denies pain    Goal: Readiness for Transition of Care  Outcome: Ongoing (interventions implemented as appropriate)  Being transferred to cardio B today    Problem: Skin Injury Risk Increased  Goal: Skin Health and Integrity  Outcome: Ongoing (interventions implemented as appropriate)  No s/s of skin injury or break down    Problem: Cardiovascular Alteration  Goal: Hemodynamic Stability  Hemodynamically stable

## 2019-08-20 LAB
ANION GAP SERPL CALC-SCNC: 7 MMOL/L (ref 8–16)
BUN SERPL-MCNC: 32 MG/DL (ref 8–23)
CALCIUM SERPL-MCNC: 8.3 MG/DL (ref 8.7–10.5)
CHLORIDE SERPL-SCNC: 104 MMOL/L (ref 95–110)
CO2 SERPL-SCNC: 24 MMOL/L (ref 23–29)
CREAT SERPL-MCNC: 1.7 MG/DL (ref 0.5–1.4)
ERYTHROCYTE [DISTWIDTH] IN BLOOD BY AUTOMATED COUNT: 18.3 % (ref 11.5–14.5)
EST. GFR  (AFRICAN AMERICAN): 49.2 ML/MIN/1.73 M^2
EST. GFR  (NON AFRICAN AMERICAN): 42.6 ML/MIN/1.73 M^2
GLUCOSE SERPL-MCNC: 83 MG/DL (ref 70–110)
HCT VFR BLD AUTO: 41.3 % (ref 40–54)
HGB BLD-MCNC: 13.5 G/DL (ref 14–18)
MAGNESIUM SERPL-MCNC: 1.9 MG/DL (ref 1.6–2.6)
MCH RBC QN AUTO: 22.2 PG (ref 27–31)
MCHC RBC AUTO-ENTMCNC: 32.7 G/DL (ref 32–36)
MCV RBC AUTO: 68 FL (ref 82–98)
PLATELET # BLD AUTO: 220 K/UL (ref 150–350)
PMV BLD AUTO: ABNORMAL FL (ref 9.2–12.9)
POTASSIUM SERPL-SCNC: 3.5 MMOL/L (ref 3.5–5.1)
RBC # BLD AUTO: 6.09 M/UL (ref 4.6–6.2)
SODIUM SERPL-SCNC: 135 MMOL/L (ref 136–145)
WBC # BLD AUTO: 4.53 K/UL (ref 3.9–12.7)

## 2019-08-20 PROCEDURE — 36415 COLL VENOUS BLD VENIPUNCTURE: CPT

## 2019-08-20 PROCEDURE — 25000003 PHARM REV CODE 250: Performed by: INTERNAL MEDICINE

## 2019-08-20 PROCEDURE — 80048 BASIC METABOLIC PNL TOTAL CA: CPT

## 2019-08-20 PROCEDURE — 83735 ASSAY OF MAGNESIUM: CPT

## 2019-08-20 PROCEDURE — 85027 COMPLETE CBC AUTOMATED: CPT

## 2019-08-20 PROCEDURE — 25000003 PHARM REV CODE 250: Performed by: NURSE PRACTITIONER

## 2019-08-20 PROCEDURE — 63600175 PHARM REV CODE 636 W HCPCS: Performed by: INTERNAL MEDICINE

## 2019-08-20 PROCEDURE — 94761 N-INVAS EAR/PLS OXIMETRY MLT: CPT

## 2019-08-20 PROCEDURE — 21400001 HC TELEMETRY ROOM

## 2019-08-20 RX ORDER — LISINOPRIL 10 MG/1
10 TABLET ORAL DAILY
Status: DISCONTINUED | OUTPATIENT
Start: 2019-08-20 | End: 2019-08-21 | Stop reason: HOSPADM

## 2019-08-20 RX ORDER — METOPROLOL SUCCINATE 50 MG/1
50 TABLET, EXTENDED RELEASE ORAL 2 TIMES DAILY
Status: DISCONTINUED | OUTPATIENT
Start: 2019-08-20 | End: 2019-08-21 | Stop reason: HOSPADM

## 2019-08-20 RX ORDER — LISINOPRIL 10 MG/1
10 TABLET ORAL DAILY
Status: DISCONTINUED | OUTPATIENT
Start: 2019-08-21 | End: 2019-08-20

## 2019-08-20 RX ADMIN — POTASSIUM CHLORIDE 40 MEQ: 1.5 SOLUTION ORAL at 07:08

## 2019-08-20 RX ADMIN — HYDRALAZINE HYDROCHLORIDE 10 MG: 20 INJECTION INTRAMUSCULAR; INTRAVENOUS at 05:08

## 2019-08-20 RX ADMIN — CHLORHEXIDINE GLUCONATE 15 ML: 1.2 RINSE ORAL at 09:08

## 2019-08-20 RX ADMIN — ATORVASTATIN CALCIUM 20 MG: 20 TABLET, FILM COATED ORAL at 09:08

## 2019-08-20 RX ADMIN — METOPROLOL SUCCINATE 50 MG: 50 TABLET, FILM COATED, EXTENDED RELEASE ORAL at 09:08

## 2019-08-20 RX ADMIN — RIVAROXABAN 15 MG: 15 TABLET, FILM COATED ORAL at 05:08

## 2019-08-20 RX ADMIN — POTASSIUM CHLORIDE 40 MEQ: 20 SOLUTION ORAL at 09:08

## 2019-08-20 RX ADMIN — MUPIROCIN: 20 OINTMENT TOPICAL at 09:08

## 2019-08-20 RX ADMIN — ASPIRIN 81 MG 81 MG: 81 TABLET ORAL at 09:08

## 2019-08-20 RX ADMIN — AMIODARONE HYDROCHLORIDE 400 MG: 200 TABLET ORAL at 09:08

## 2019-08-20 RX ADMIN — LISINOPRIL 10 MG: 10 TABLET ORAL at 09:08

## 2019-08-20 NOTE — PLAN OF CARE
Problem: Fall Injury Risk  Goal: Absence of Fall and Fall-Related Injury    Intervention: Identify and Manage Contributors to Fall Injury Risk  Fall prevention instructions reviewed  Intervention: Promote Injury-Free Environment  Injury free environment supported      Problem: Skin Injury Risk Increased  Goal: Skin Health and Integrity  Skin integrity promoted

## 2019-08-20 NOTE — SUBJECTIVE & OBJECTIVE
Interval History: feels better    Review of Systems   Constitutional: Negative.    HENT: Negative.    Eyes: Negative.    Respiratory: Negative.    Cardiovascular: Negative.    Gastrointestinal: Negative.    Endocrine: Negative.    Genitourinary: Negative.    Musculoskeletal: Negative.    Skin: Negative.    Allergic/Immunologic: Negative.    Neurological: Negative.    Hematological: Negative.    All other systems reviewed and are negative.    Objective:     Vital Signs (Most Recent):  Temp: 98.8 °F (37.1 °C) (08/20/19 1100)  Pulse: 82 (08/20/19 1100)  Resp: (!) 26 (08/20/19 1100)  BP: (!) 135/94 (08/20/19 1100)  SpO2: 99 % (08/20/19 1100) Vital Signs (24h Range):  Temp:  [98.3 °F (36.8 °C)-99 °F (37.2 °C)] 98.8 °F (37.1 °C)  Pulse:  [72-82] 82  Resp:  [13-26] 26  SpO2:  [95 %-99 %] 99 %  BP: (101-135)/() 135/94     Weight: 82.3 kg (181 lb 7 oz)  Body mass index is 25.31 kg/m².    Intake/Output Summary (Last 24 hours) at 8/20/2019 1413  Last data filed at 8/20/2019 0500  Gross per 24 hour   Intake 1170 ml   Output 1675 ml   Net -505 ml      Physical Exam   Constitutional: He appears well-developed and well-nourished.   HENT:   Head: Normocephalic and atraumatic.   Right Ear: External ear normal.   Left Ear: External ear normal.   Nose: Nose normal.   Mouth/Throat: Oropharynx is clear and moist.   Eyes: Pupils are equal, round, and reactive to light. Conjunctivae and EOM are normal.   Neck: Normal range of motion. Neck supple.   Cardiovascular: Normal rate, regular rhythm, normal heart sounds and intact distal pulses.   Pulmonary/Chest: Effort normal and breath sounds normal.   Abdominal: Soft. Bowel sounds are normal.   Musculoskeletal: Normal range of motion.   Neurological:   Left arcelia   Skin: Skin is warm and dry. Capillary refill takes less than 2 seconds.   Psychiatric: He has a normal mood and affect. His behavior is normal. Judgment and thought content normal.   Nursing note and vitals  reviewed.      Significant Labs:   BMP:   Recent Labs   Lab 08/20/19 0414   GLU 83   *   K 3.5      CO2 24   BUN 32*   CREATININE 1.7*   CALCIUM 8.3*   MG 1.9     CBC:   Recent Labs   Lab 08/19/19  0614 08/20/19 0414   WBC 4.36 4.53   HGB 14.3 13.5*   HCT 44.3 41.3    220       Significant Imaging: I have reviewed all pertinent imaging results/findings within the past 24 hours.

## 2019-08-20 NOTE — PROGRESS NOTES
[facility]  Cardiology  Progress Note    Patient Name: Surya Villagomez  MRN: [id number]  Admission Date: 8/17/2019  Hospital Length of Stay: 2 days  Code Status: Full Code   Attending Physician: Demarcus Cobb MD   Primary Care Physician: GENIE Bird  Expected Discharge Date:   Principal Problem:V-tach    Subjective:       Interval History: Denies any chest pain or shortness of breath. Denies any dizziness. Had few episodes of NSVT.     ROS   Denies any dizziness.   Denies any abdominal pain.   Denies any seizures.     Objective:     Vital Signs (Most Recent):  Temp: 98.2 °F (36.8 °C) (08/19/19 0701)  Pulse: 76 (08/19/19 1635)  Resp: (!) 22 (08/19/19 1635)  BP: 123/80 (08/19/19 1635)  SpO2: 97 % (08/19/19 1635) Vital Signs (24h Range):  Temp:  [97.9 °F (36.6 °C)-99.2 °F (37.3 °C)] 98.2 °F (36.8 °C)  Pulse:  [72-90] 76  Resp:  [11-44] 22  SpO2:  [80 %-99 %] 97 %  BP: ()/() 123/80     Weight: 88.5 kg (195 lb 1.7 oz)  Body mass index is 27.21 kg/m².    SpO2: 97 %  O2 Device (Oxygen Therapy): room air      Intake/Output Summary (Last 24 hours) at 8/19/2019 2003  Last data filed at 8/19/2019 1800  Gross per 24 hour   Intake 1040 ml   Output 2200 ml   Net -1160 ml       Lines/Drains/Airways     Peripheral Intravenous Line                 Peripheral IV - Single Lumen 08/17/19 20 G Right Hand 2 days         Peripheral IV - Single Lumen 08/17/19 2140 20 G Right Antecubital 1 day                Scheduled Meds:   amiodarone  400 mg Oral BID    aspirin  81 mg Oral Daily    atorvastatin  20 mg Oral Daily    chlorhexidine  15 mL Mouth/Throat BID    metoprolol succinate  50 mg Oral Daily    mupirocin   Nasal BID    rivaroxaban  15 mg Oral with dinner     Continuous Infusions:   amiodarone in dextrose 5% Stopped (08/19/19 0042)     PRN Meds:.acetaminophen, hydrALAZINE, magnesium oxide, magnesium oxide, nitroGLYCERIN, ondansetron, potassium chloride 10%, potassium chloride 10%, potassium  chloride 10%, potassium, sodium phosphates, potassium, sodium phosphates, potassium, sodium phosphates, sodium chloride 0.9%     Physical Exam  HEENT: Normocephalic, atraumatic, PERRL, Conjunctiva pink, no scleral icterus.   CVS: S1S2+, RRR, no murmurs, rubs or gallops, JVP: Normal.  LUNGS: Fine crackles  ABDOMEN: Soft, NT, BS+  EXTREMITIES: No cyanosis, clubbing or edema. Left sided weakness.   NEURO: AAO X 3.     Significant Labs:   BMP:   Recent Labs   Lab 08/17/19 2146 08/18/19 0607 08/19/19 0614 08/19/19  1343    123* 96  --     142 141  --    K 4.0 4.0 3.6 4.2    109 105  --    CO2 22* 20* 23  --    BUN 31* 32* 31*  --    CREATININE 1.9* 1.7* 1.6*  --    CALCIUM 8.9 8.9 9.0  --    MG  --  2.2 2.1  --    , CMP   Recent Labs   Lab 08/17/19 2146 08/18/19 0607 08/19/19 0614 08/19/19  1343    142 141  --    K 4.0 4.0 3.6 4.2    109 105  --    CO2 22* 20* 23  --     123* 96  --    BUN 31* 32* 31*  --    CREATININE 1.9* 1.7* 1.6*  --    CALCIUM 8.9 8.9 9.0  --    PROT 6.5  --   --   --    ALBUMIN 3.6  --   --   --    BILITOT 2.5*  --   --   --    ALKPHOS 63  --   --   --    AST 21  --   --   --    ALT 25  --   --   --    ANIONGAP 12 13 13  --    ESTGFRAFRICA 43.0* 49.2* 53.0*  --    EGFRNONAA 37.2* 42.6* 45.8*  --    , CBC   Recent Labs   Lab 08/17/19 2146 08/18/19 0607 08/19/19 0614   WBC 4.64  --  4.25 4.36   HGB 14.4  --  14.1 14.3   HCT 45.2   < > 42.7 44.3     --  188 213    < > = values in this interval not displayed.   , INR   Recent Labs   Lab 08/17/19  2146   INR 1.5   , Lipid Panel   Recent Labs   Lab 08/18/19  0607   CHOL 123   HDL 38*   LDLCALC 69.6   TRIG 77   CHOLHDL 30.9    and Troponin   Recent Labs   Lab 08/19/19  0023 08/19/19  0614 08/19/19  1853   TROPONINI 0.039 0.040 0.036       Significant Imaging:     Assessment and Plan:     IMPRESSION:  Ventricular tachycardia. Multiple episodes. 210 runs of SVT since 1/2019. 9,588 VT episodes since  1/2019. On amio drip and metoprolol  Dilated cardiomyopathy. Out of proportion to CAD. 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   Atrial fibrillation.   ICD in situ. Medtronic device.   CHF. Acute on chronic. BNP 2404.   Mildly elevated troponin. Likely related to ICD shocks. Not consistent with ACS.   Cad S/P PCI to first OM with BRADLEY 9/26/2019. Plavix recommended to be stopped by Dr. Matta.   Clotting disorder. Elevated homocysteine level and decreased Factor XII.. Left leg DVT on Xarelto/ASA. Followed by Dr. Matta.   CVA in 2011. Left leg weakness.   CKD.. Creatinine in 2018 was 1.83. 1.7 now.   HTN  Hyperlipidemia.       PLAN:  1. Increase amiodarone to 400 mg BID.   2. Continue current management otherwise.   3. Ok to transfer to stefanie Pate MD  Cardiology  [facility]

## 2019-08-20 NOTE — PROGRESS NOTES
[facility] Medicine  Progress Note    Patient Name: Surya Villagomez  MRN: [id number]  Patient Class: IP- Inpatient   Admission Date: 8/17/2019  Length of Stay: 3 days  Attending Physician: Demarcus Cobb MD  Primary Care Provider: GENIE Bird        Subjective:     Principal Problem:V-tach        HPI:  Mr. Villagomez presents today with complaints of chest pain. It is severe. It is associated with getting shocked by his AICD multiple times today, SOB, and 5 lb wt gain. He denies fever, chills, N/V/D, dizziness, or LOC. He states he ran out of his cardiac medicine and [facility] couldn't refill them until today so he hasn't had his medication for 5 days. He states he started feeling the pain around 11 am, but waited for his brother to get home with his medication and take that before he came in this afternoon. He has a history of dilated cardiomyopathy, EF 25%, persistent A. fib, hypertension, CKD3, elevated homocysteine levels, decreased factor XII activity, and coagulapathy, history of CVA with left hemiplegia since 2011, LHC from August 2018 by , showed severe OM disease which was stented with 1 BRADLEY, otherwise mild nonobstructive CAD. He was then placed on a lifevest and subsequently had a biventricular AICD placed. His last appt with Dr. Marmolejo was in Feb and at that time he was taken off of zaroxlyn and decreased his lasix d/t hypotension. He states he wants to establish care in here in [place]. In the ED, his AICD was interrogated and showed he received a total of 5 shocks today beginning at 5:49am, 6:45am, and 10:55 am.       Overview/Hospital Course:  Admitted to ICU on amiodarone infusion. Seen by Oscar HURD and Bernardo NELSON today already.   8/19 remains in ICU. Amiodarone infusion finished last PM. Received 1 dose po. Had 11 beat run ventricular tachycardia this AM (~1100 hours). Asymptomatic. Transferred to basic Cardiology. Had 20 second run of asymptomatic V-tach this  AM. Feels well. Expect to discharge in AM    Interval History: feels better    Review of Systems   Constitutional: Negative.    HENT: Negative.    Eyes: Negative.    Respiratory: Negative.    Cardiovascular: Negative.    Gastrointestinal: Negative.    Endocrine: Negative.    Genitourinary: Negative.    Musculoskeletal: Negative.    Skin: Negative.    Allergic/Immunologic: Negative.    Neurological: Negative.    Hematological: Negative.    All other systems reviewed and are negative.    Objective:     Vital Signs (Most Recent):  Temp: 98.8 °F (37.1 °C) (08/20/19 1100)  Pulse: 82 (08/20/19 1100)  Resp: (!) 26 (08/20/19 1100)  BP: (!) 135/94 (08/20/19 1100)  SpO2: 99 % (08/20/19 1100) Vital Signs (24h Range):  Temp:  [98.3 °F (36.8 °C)-99 °F (37.2 °C)] 98.8 °F (37.1 °C)  Pulse:  [72-82] 82  Resp:  [13-26] 26  SpO2:  [95 %-99 %] 99 %  BP: (101-135)/() 135/94     Weight: 82.3 kg (181 lb 7 oz)  Body mass index is 25.31 kg/m².    Intake/Output Summary (Last 24 hours) at 8/20/2019 1413  Last data filed at 8/20/2019 0500  Gross per 24 hour   Intake 1170 ml   Output 1675 ml   Net -505 ml      Physical Exam   Constitutional: He appears well-developed and well-nourished.   HENT:   Head: Normocephalic and atraumatic.   Right Ear: External ear normal.   Left Ear: External ear normal.   Nose: Nose normal.   Mouth/Throat: Oropharynx is clear and moist.   Eyes: Pupils are equal, round, and reactive to light. Conjunctivae and EOM are normal.   Neck: Normal range of motion. Neck supple.   Cardiovascular: Normal rate, regular rhythm, normal heart sounds and intact distal pulses.   Pulmonary/Chest: Effort normal and breath sounds normal.   Abdominal: Soft. Bowel sounds are normal.   Musculoskeletal: Normal range of motion.   Neurological:   Left arcelia   Skin: Skin is warm and dry. Capillary refill takes less than 2 seconds.   Psychiatric: He has a normal mood and affect. His behavior is normal. Judgment and thought content normal.    Nursing note and vitals reviewed.      Significant Labs:   BMP:   Recent Labs   Lab 08/20/19  0414   GLU 83   *   K 3.5      CO2 24   BUN 32*   CREATININE 1.7*   CALCIUM 8.3*   MG 1.9     CBC:   Recent Labs   Lab 08/19/19  0614 08/20/19  0414   WBC 4.36 4.53   HGB 14.3 13.5*   HCT 44.3 41.3    220       Significant Imaging: I have reviewed all pertinent imaging results/findings within the past 24 hours.      Assessment/Plan:      * V-tach  And Vfib per [employer] AICD interrogation s/p 5 shocks today  Admit to ICU  Amiodarone gtt  Resume home meds  2 grams mag IV now then check level in AM   Consult Dr. Pate         Elevated troponin  Likely from heart failure and AICD shocks   Will trend      Acute decompensated heart failure  Likely from not taking his medications for 5 days   IV lasix x3 doses 12 hours apart  Accurate I&O  Daily wt   Repeat CXR in AM         Chest pain  Likely from AICD shocks and heart failure - currently chest pain free  Resume home meds  Trend cards         Hypercoagulation syndrome  Per Dr. Matta, he recommends continued treatment with Xarelto and ASA, I have stopped the plavix based on these recommendations and will defer to cardiology if they would like to continue triple therapy        VTE Risk Mitigation (From admission, onward)        Ordered     rivaroxaban tablet 15 mg  with dinner      08/18/19 0117     IP VTE HIGH RISK PATIENT  Once      08/18/19 0117     Reason for No Pharmacological VTE Prophylaxis  Once      08/18/19 0117                Demarcus Cobb MD  Department of Hospital Medicine   [facility]

## 2019-08-20 NOTE — PROGRESS NOTES
[facility]  Cardiology  Progress Note    Patient Name: uSrya Villagomez  MRN: [id number]  Admission Date: 8/17/2019  Hospital Length of Stay: 3 days  Code Status: Full Code   Attending Physician: Demarcus Cobb MD   Primary Care Physician: GENIE Bird  Expected Discharge Date: 8/21/2019  Principal Problem:V-tach    Subjective:       Interval History: Denies any chest pain or shortness of breath. Denies any dizziness. Had few episodes of NSVT. Had a 20 sec episode (asymptomatic) today.     ROS   Denies any dizziness.   Denies any abdominal pain.   Denies any seizures.     Objective:     Vital Signs (Most Recent):  Temp: 98.1 °F (36.7 °C) (08/20/19 1600)  Pulse: 60 (08/20/19 1600)  Resp: (!) 23 (08/20/19 1600)  BP: (!) 143/102 (08/20/19 1600)  SpO2: 95 % (08/20/19 1600) Vital Signs (24h Range):  Temp:  [98.1 °F (36.7 °C)-99 °F (37.2 °C)] 98.1 °F (36.7 °C)  Pulse:  [60-82] 60  Resp:  [20-26] 23  SpO2:  [95 %-99 %] 95 %  BP: (111-143)/() 143/102     Weight: 82.3 kg (181 lb 7 oz)  Body mass index is 25.31 kg/m².    SpO2: 95 %  O2 Device (Oxygen Therapy): room air      Intake/Output Summary (Last 24 hours) at 8/20/2019 1657  Last data filed at 8/20/2019 1300  Gross per 24 hour   Intake 1590 ml   Output 1926 ml   Net -336 ml       Lines/Drains/Airways     Peripheral Intravenous Line                 Peripheral IV - Single Lumen 08/17/19 20 G Right Hand 3 days                Scheduled Meds:   amiodarone  400 mg Oral BID    aspirin  81 mg Oral Daily    atorvastatin  20 mg Oral Daily    chlorhexidine  15 mL Mouth/Throat BID    metoprolol succinate  50 mg Oral Daily    mupirocin   Nasal BID    rivaroxaban  15 mg Oral with dinner     Continuous Infusions:    PRN Meds:.acetaminophen, hydrALAZINE, magnesium oxide, magnesium oxide, nitroGLYCERIN, ondansetron, potassium chloride 10%, potassium chloride 10%, potassium chloride 10%, potassium, sodium phosphates, potassium, sodium phosphates,  potassium, sodium phosphates, sodium chloride 0.9%     Physical Exam  HEENT: Normocephalic, atraumatic, PERRL, Conjunctiva pink, no scleral icterus.   CVS: S1S2+, RRR, no murmurs, rubs or gallops, JVP: Normal.  LUNGS: Fine crackles  ABDOMEN: Soft, NT, BS+  EXTREMITIES: No cyanosis, clubbing or edema. Left sided weakness.   NEURO: AAO X 3.     Significant Labs:   BMP:   Recent Labs   Lab 08/19/19  0614 08/19/19  1343 08/20/19  0414   GLU 96  --  83     --  135*   K 3.6 4.2 3.5     --  104   CO2 23  --  24   BUN 31*  --  32*   CREATININE 1.6*  --  1.7*   CALCIUM 9.0  --  8.3*   MG 2.1  --  1.9   , CMP   Recent Labs   Lab 08/19/19  0614 08/19/19  1343 08/20/19  0414     --  135*   K 3.6 4.2 3.5     --  104   CO2 23  --  24   GLU 96  --  83   BUN 31*  --  32*   CREATININE 1.6*  --  1.7*   CALCIUM 9.0  --  8.3*   ANIONGAP 13  --  7*   ESTGFRAFRICA 53.0*  --  49.2*   EGFRNONAA 45.8*  --  42.6*   , CBC   Recent Labs   Lab 08/19/19  0614 08/20/19  0414   WBC 4.36 4.53   HGB 14.3 13.5*   HCT 44.3 41.3    220   , INR   No results for input(s): INR, PROTIME in the last 48 hours., Lipid Panel   No results for input(s): CHOL, HDL, LDLCALC, TRIG, CHOLHDL in the last 48 hours. and Troponin   Recent Labs   Lab 08/19/19  0023 08/19/19  0614 08/19/19  1853   TROPONINI 0.039 0.040 0.036       Significant Imaging:     Assessment and Plan:     IMPRESSION:  Ventricular tachycardia. Multiple episodes. 210 runs of SVT since 1/2019. 9,588 VT episodes since 1/2019. On amio drip and metoprolol  Dilated cardiomyopathy. Out of proportion to CAD. 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 Marmolejo at [facility].   Atrial fibrillation.   ICD in situ. Medtronic device.   CHF. Acute on chronic. BNP 2404.   Mildly elevated troponin. Likely related to ICD shocks. Not consistent with ACS.   Cad S/P PCI to first OM with BRADLEY 9/26/2019. Plavix recommended to be stopped  by Dr. Matta.   Clotting disorder. Elevated homocysteine level and decreased Factor XII.. Left leg DVT on Xarelto/ASA. Followed by Dr. Matta.   CVA in 2011. Left leg weakness.   CKD.. Creatinine in 2018 was 1.83. 1.7 now.   HTN. Not well controlled.   Hyperlipidemia.       PLAN:  Resume Lisinopril 10 mg daily.   Increase Toprol to 50 mg BID.   Check TSH.   Continue current management otherwise.       Modesta Pate MD  Cardiology  [facility]

## 2019-08-20 NOTE — PROGRESS NOTES
Cardiac Rehab     Surya Villagomez   [id number]   8/20/2019         Cardiac Rehab Phase Taught: Phase 1    Teaching Method: Verbal, Written    Handouts: Cardiac Rehab Tear Sheet    Educational Videos: None    Understanding:  Learning indicated by feedback and Verbalize understanding    Comments: Education on cardiac rehab provided. Questions answered. Cardiac rehab orientation appt scheduled for 8/20 @ 1:00            Zahra Daugherty RN

## 2019-08-21 VITALS
BODY MASS INDEX: 25.25 KG/M2 | OXYGEN SATURATION: 95 % | SYSTOLIC BLOOD PRESSURE: 141 MMHG | DIASTOLIC BLOOD PRESSURE: 93 MMHG | TEMPERATURE: 99 F | WEIGHT: 180.38 LBS | HEIGHT: 71 IN | RESPIRATION RATE: 20 BRPM | HEART RATE: 80 BPM

## 2019-08-21 PROBLEM — R07.9 CHEST PAIN: Status: RESOLVED | Noted: 2019-08-17 | Resolved: 2019-08-21

## 2019-08-21 PROBLEM — R79.89 ELEVATED TROPONIN: Status: RESOLVED | Noted: 2019-08-18 | Resolved: 2019-08-21

## 2019-08-21 LAB — MAGNESIUM SERPL-MCNC: 1.9 MG/DL (ref 1.6–2.6)

## 2019-08-21 PROCEDURE — 36415 COLL VENOUS BLD VENIPUNCTURE: CPT

## 2019-08-21 PROCEDURE — 83735 ASSAY OF MAGNESIUM: CPT

## 2019-08-21 PROCEDURE — 25000003 PHARM REV CODE 250: Performed by: INTERNAL MEDICINE

## 2019-08-21 PROCEDURE — 63600175 PHARM REV CODE 636 W HCPCS: Performed by: INTERNAL MEDICINE

## 2019-08-21 PROCEDURE — 25000003 PHARM REV CODE 250: Performed by: NURSE PRACTITIONER

## 2019-08-21 PROCEDURE — 94761 N-INVAS EAR/PLS OXIMETRY MLT: CPT

## 2019-08-21 RX ORDER — AMIODARONE HYDROCHLORIDE 400 MG/1
400 TABLET ORAL 2 TIMES DAILY
Qty: 60 TABLET | Refills: 11 | Status: SHIPPED | OUTPATIENT
Start: 2019-08-21 | End: 2020-08-20

## 2019-08-21 RX ORDER — METOPROLOL SUCCINATE 50 MG/1
50 TABLET, EXTENDED RELEASE ORAL 2 TIMES DAILY
Qty: 60 TABLET | Refills: 11 | Status: SHIPPED | OUTPATIENT
Start: 2019-08-21 | End: 2020-08-20

## 2019-08-21 RX ORDER — LISINOPRIL 10 MG/1
10 TABLET ORAL DAILY
Qty: 90 TABLET | Refills: 3 | Status: SHIPPED | OUTPATIENT
Start: 2019-08-22 | End: 2020-08-21

## 2019-08-21 RX ADMIN — ASPIRIN 81 MG 81 MG: 81 TABLET ORAL at 09:08

## 2019-08-21 RX ADMIN — AMIODARONE HYDROCHLORIDE 400 MG: 200 TABLET ORAL at 08:08

## 2019-08-21 RX ADMIN — METOPROLOL SUCCINATE 50 MG: 50 TABLET, FILM COATED, EXTENDED RELEASE ORAL at 09:08

## 2019-08-21 RX ADMIN — LISINOPRIL 10 MG: 10 TABLET ORAL at 09:08

## 2019-08-21 RX ADMIN — ATORVASTATIN CALCIUM 20 MG: 20 TABLET, FILM COATED ORAL at 08:08

## 2019-08-21 RX ADMIN — CHLORHEXIDINE GLUCONATE 15 ML: 1.2 RINSE ORAL at 09:08

## 2019-08-21 RX ADMIN — HYDRALAZINE HYDROCHLORIDE 10 MG: 20 INJECTION INTRAMUSCULAR; INTRAVENOUS at 04:08

## 2019-08-21 NOTE — SUBJECTIVE & OBJECTIVE
Interval History: no change    Review of Systems   Constitutional: Negative.    HENT: Negative.    Eyes: Negative.    Respiratory: Negative.    Cardiovascular: Negative.    Gastrointestinal: Negative.    Endocrine: Negative.    Genitourinary: Negative.    Musculoskeletal: Negative.    Skin: Negative.    Allergic/Immunologic: Negative.    Neurological: Negative.    Hematological: Negative.    All other systems reviewed and are negative.    Objective:     Vital Signs (Most Recent):  Temp: 99.3 °F (37.4 °C) (08/21/19 0701)  Pulse: 80 (08/21/19 0859)  Resp: 20 (08/21/19 0750)  BP: (!) 141/93 (08/21/19 0859)  SpO2: 95 % (08/21/19 0750) Vital Signs (24h Range):  Temp:  [98.1 °F (36.7 °C)-99.3 °F (37.4 °C)] 99.3 °F (37.4 °C)  Pulse:  [60-94] 80  Resp:  [18-26] 20  SpO2:  [95 %-99 %] 95 %  BP: (127-161)/() 141/93     Weight: 81.8 kg (180 lb 5.7 oz)  Body mass index is 25.15 kg/m².    Intake/Output Summary (Last 24 hours) at 8/21/2019 0926  Last data filed at 8/21/2019 0800  Gross per 24 hour   Intake 710 ml   Output 700 ml   Net 10 ml      Physical Exam   Constitutional: He is oriented to person, place, and time. He appears well-developed and well-nourished.   HENT:   Head: Normocephalic and atraumatic.   Right Ear: External ear normal.   Left Ear: External ear normal.   Nose: Nose normal.   Mouth/Throat: Oropharynx is clear and moist.   Eyes: Pupils are equal, round, and reactive to light. Conjunctivae and EOM are normal.   Neck: Normal range of motion. Neck supple.   Cardiovascular: Normal rate, normal heart sounds and intact distal pulses.   Pulmonary/Chest: Effort normal and breath sounds normal.   Abdominal: Soft. Bowel sounds are normal.   Musculoskeletal: Normal range of motion.   Neurological: He is alert and oriented to person, place, and time.   Left arcelia   Skin: Skin is warm and dry. Capillary refill takes less than 2 seconds.   Psychiatric: He has a normal mood and affect. His behavior is normal. Judgment  and thought content normal.   Nursing note and vitals reviewed.      Significant Labs:   BMP:   Recent Labs   Lab 08/20/19 0414 08/21/19 0409   GLU 83  --    *  --    K 3.5  --      --    CO2 24  --    BUN 32*  --    CREATININE 1.7*  --    CALCIUM 8.3*  --    MG 1.9 1.9     CBC:   Recent Labs   Lab 08/20/19 0414   WBC 4.53   HGB 13.5*   HCT 41.3          Significant Imaging: I have reviewed all pertinent imaging results/findings within the past 24 hours.

## 2019-08-21 NOTE — DISCHARGE SUMMARY
[facility]  Discharge Summary      Patient Name: Surya Villagomez  MRN: [id number]  Admission Date: 8/17/2019  Hospital Length of Stay: 4 days  Discharge Date and Time:  08/21/2019 10:07 AM  Attending Physician: Demarcus Cobb MD   Discharging Provider: Demarcus Cobb MD  Primary Care Provider: GENIE Bird      HPI:   Mr. Villagomez presents today with complaints of chest pain. It is severe. It is associated with getting shocked by his AICD multiple times today, SOB, and 5 lb wt gain. He denies fever, chills, N/V/D, dizziness, or LOC. He states he ran out of his cardiac medicine and [facility] couldn't refill them until today so he hasn't had his medication for 5 days. He states he started feeling the pain around 11 am, but waited for his brother to get home with his medication and take that before he came in this afternoon. He has a history of dilated cardiomyopathy, EF 25%, persistent A. fib, hypertension, CKD3, elevated homocysteine levels, decreased factor XII activity, and coagulapathy, history of CVA with left hemiplegia since 2011, LHC from August 2018 by , showed severe OM disease which was stented with 1 BRADLEY, otherwise mild nonobstructive CAD. He was then placed on a lifevest and subsequently had a biventricular AICD placed. His last appt with Dr. Marmolejo was in Feb and at that time he was taken off of zaroxlyn and decreased his lasix d/t hypotension. He states he wants to establish care in here in [place]. In the ED, his AICD was interrogated and showed he received a total of 5 shocks today beginning at 5:49am, 6:45am, and 10:55 am.       * No surgery found *      Hospital Course:   Admitted to ICU on amiodarone infusion. Seen by Oscar HURD and Bernardo NELSON today already.   8/19 remains in ICU. Amiodarone infusion finished last PM. Received 1 dose po. Had 11 beat run ventricular tachycardia this AM (~1100 hours). Asymptomatic. Transferred to basic Cardiology. Had  20 second run of asymptomatic V-tach this AM. Feels well. Expect to discharge in AM.   8/21: multiple runs of non-sustained V-tach, despite increase of metoprolol. Diastolic BP has remained elevated. Remains asymptomatic. Discussed patient with Cardiology: discharge patient on current regimen; will be seen in clinic in 10 days, at which time EP evaluation will be arranged.     Consults:   Consults (From admission, onward)        Status Ordering Provider     Inpatient consult to Cardiology  Once     Provider:  Modesta Pate MD    Completed PARIS MATAMOROS     Inpatient consult to Hospitalist  Once     Provider:  Dilip Chang MD    Completed TRACY MESA          No new Assessment & Plan notes have been filed under this hospital service since the last note was generated.  Service: Hospital Medicine    Final Active Diagnoses:    Diagnosis Date Noted POA    PRINCIPAL PROBLEM:  V-tach [I47.2] 08/18/2019 Yes    Ventricular tachycardia [I47.2] 08/19/2019 Yes    Acute decompensated heart failure [I50.9] 08/18/2019 Yes    Hypercoagulation syndrome [D68.59] 05/10/2019 Yes     Chronic      Problems Resolved During this Admission:    Diagnosis Date Noted Date Resolved POA    Elevated troponin [R74.8] 08/18/2019 08/21/2019 Yes    Chest pain [R07.9] 08/17/2019 08/21/2019 Yes       Discharged Condition: good    Disposition: Home or Self Care    Follow Up:  Follow-up Information     Modesta Pate MD On 9/2/2019.    Specialties:  Cardiovascular Disease, Cardiology  Contact information:  [address] [place] [address] [id number] [phone]                 Patient Instructions:      Diet Cardiac     Activity as tolerated       Significant Diagnostic Studies: Labs:   BMP:   Recent Labs   Lab 08/19/19  1343 08/20/19  0414 08/21/19  0409   GLU  --  83  --    NA  --  135*  --    K 4.2 3.5  --    CL  --  104  --    CO2  --  24  --    BUN  --  32*  --    CREATININE  --  1.7*  --    CALCIUM   --  8.3*  --    MG  --  1.9 1.9    and CBC   Recent Labs   Lab 08/20/19  0414   WBC 4.53   HGB 13.5*   HCT 41.3          Pending Diagnostic Studies:     Procedure Component Value Units Date/Time    Transthoracic echo (TTE) Complete [[id number]]  (Abnormal) Resulted:  08/18/19 1404    Order Status:  Sent Lab Status:  In process Updated:  08/18/19 1404     BSA 2.1 m2      TDI SEPTAL 0.05 m/s      LV LATERAL E/E' RATIO 15.00 m/s      LV SEPTAL E/E' RATIO 18.00 m/s      TDI LATERAL 0.06 m/s      PV PEAK VELOCITY 68.44 cm/s      LVIDD 6.34 cm      IVS 1.24 cm      PW 1.11 cm      LVIDS 5.57 cm      FS 12 %      LV mass 334.56 g      LA size 5.45 cm      RVDD 291.00 cm      Left Ventricle Relative Wall Thickness 0.35 cm      AV mean gradient 2 mmHg      AV valve area 3.68 cm2      AV Velocity Ratio 70.62     AV index (prosthetic) 0.60     E/A ratio 2.90     Mean e' 0.06 m/s      E wave decelartion time 121.46 msec      IVRT 128.46 msec      LVOT diameter 2.79 cm      LVOT area 6.1 cm2      LVOT peak shola 57.20 m/s      LVOT peak VTI 7.81 cm      Ao peak shola 0.81 m/s      Ao VTI 12.97 cm      LVOT stroke volume 47.72 cm3      AV peak gradient 3 mmHg      E/E' ratio 16.36 m/s      MV Peak E Shola 0.90 m/s      TR Max Shola 3.54 m/s      MV Peak A Shola 0.31 m/s      LV Systolic Volume 141.50 mL      LV Systolic Volume Index 67.8 mL/m2      LV Diastolic Volume 156.50 mL      LV Diastolic Volume Index 75.01 mL/m2      LV Mass Index 160 g/m2      Triscuspid Valve Regurgitation Peak Gradient 50 mmHg     Narrative:       · Eccentric left ventricular hypertrophy.       Impression:                Medications:  Reconciled Home Medications:      Medication List      CHANGE how you take these medications    amiodarone 400 MG tablet  Commonly known as:  PACERONE  Take 1 tablet (400 mg total) by mouth 2 (two) times daily.  What changed:    · medication strength  · how much to take  · when to take this     lisinopril 10 MG tablet  Take 1  tablet (10 mg total) by mouth once daily.  Start taking on:  8/22/2019  What changed:    · medication strength  · how much to take  · how to take this  · when to take this     metoprolol succinate 50 MG 24 hr tablet  Commonly known as:  TOPROL-XL  Take 1 tablet (50 mg total) by mouth 2 (two) times daily.  What changed:    · how much to take  · when to take this        CONTINUE taking these medications    aspirin-calcium carbonate 81 mg-300 mg calcium(777 mg) Tab  Take 81 mg by mouth.     atorvastatin 20 MG tablet  Commonly known as:  LIPITOR     clopidogrel 75 mg tablet  Commonly known as:  PLAVIX  TAKE 1 TABLET(75 MG) BY MOUTH DAILY     cyanocobalamin 1,000 mcg/mL injection  ADMINISTER 1 ML(1000 MCG) IN THE MUSCLE EVERY 30 DAYS     fish oil-omega-3 fatty acids 300-1,000 mg capsule  Take 1,040 mg by mouth.     furosemide 20 MG tablet  Commonly known as:  LASIX  TK 1 T PO  D     nitroGLYCERIN 0.4 MG SL tablet  Commonly known as:  NITROSTAT  Place 0.4 mg under the tongue.     omega-3 acid ethyl esters 1 gram capsule  Commonly known as:  LOVAZA  Take 2 g by mouth.     XARELTO 15 mg Tab  Generic drug:  rivaroxaban  Take 1 mL by mouth.            Indwelling Lines/Drains at time of discharge:   Lines/Drains/Airways          None          Time spent on the discharge of patient: 32 minutes  Patient was seen and examined on the date of discharge and determined to be suitable for discharge.         Demarcus Cobb MD  Department of Hospital Medicine  [facility]

## 2019-08-21 NOTE — PLAN OF CARE
Problem: Fall Injury Risk  Goal: Absence of Fall and Fall-Related Injury    Intervention: Promote Injury-Free Environment  Pt room safety maintained      Problem: Adult Inpatient Plan of Care  Goal: Absence of Hospital-Acquired Illness or Injury    Intervention: Identify and Manage Fall Risk  PT identified as a high fall risk, all precautions in place.      Problem: Skin Injury Risk Increased  Goal: Skin Health and Integrity    Intervention: Optimize Skin Protection  PT skin integrity maintained

## 2019-08-21 NOTE — PROGRESS NOTES
[facility] Medicine  Progress Note    Patient Name: Surya Villagomez  MRN: [id number]  Patient Class: IP- Inpatient   Admission Date: 8/17/2019  Length of Stay: 4 days  Attending Physician: Demarcus Cobb MD  Primary Care Provider: GENIE Bird        Subjective:     Principal Problem:V-tach        HPI:  Mr. Villagomez presents today with complaints of chest pain. It is severe. It is associated with getting shocked by his AICD multiple times today, SOB, and 5 lb wt gain. He denies fever, chills, N/V/D, dizziness, or LOC. He states he ran out of his cardiac medicine and [facility] couldn't refill them until today so he hasn't had his medication for 5 days. He states he started feeling the pain around 11 am, but waited for his brother to get home with his medication and take that before he came in this afternoon. He has a history of dilated cardiomyopathy, EF 25%, persistent A. fib, hypertension, CKD3, elevated homocysteine levels, decreased factor XII activity, and coagulapathy, history of CVA with left hemiplegia since 2011, LHC from August 2018 by , showed severe OM disease which was stented with 1 BRADLEY, otherwise mild nonobstructive CAD. He was then placed on a lifevest and subsequently had a biventricular AICD placed. His last appt with Dr. Marmolejo was in Feb and at that time he was taken off of zaroxlyn and decreased his lasix d/t hypotension. He states he wants to establish care in here in [place]. In the ED, his AICD was interrogated and showed he received a total of 5 shocks today beginning at 5:49am, 6:45am, and 10:55 am.       Overview/Hospital Course:  Admitted to ICU on amiodarone infusion. Seen by Oscar HURD and Bernardo NELSON today already.   8/19 remains in ICU. Amiodarone infusion finished last PM. Received 1 dose po. Had 11 beat run ventricular tachycardia this AM (~1100 hours). Asymptomatic. Transferred to basic Cardiology. Had 20 second run of asymptomatic V-tach this  AM. Feels well. Expect to discharge in AM.   8/21: multiple runs of non-sustained V-tach, despite increase of metoprolol. Diastolic BP has remained elevated. Remains asymptomatic    Interval History: no change    Review of Systems   Constitutional: Negative.    HENT: Negative.    Eyes: Negative.    Respiratory: Negative.    Cardiovascular: Negative.    Gastrointestinal: Negative.    Endocrine: Negative.    Genitourinary: Negative.    Musculoskeletal: Negative.    Skin: Negative.    Allergic/Immunologic: Negative.    Neurological: Negative.    Hematological: Negative.    All other systems reviewed and are negative.    Objective:     Vital Signs (Most Recent):  Temp: 99.3 °F (37.4 °C) (08/21/19 0701)  Pulse: 80 (08/21/19 0859)  Resp: 20 (08/21/19 0750)  BP: (!) 141/93 (08/21/19 0859)  SpO2: 95 % (08/21/19 0750) Vital Signs (24h Range):  Temp:  [98.1 °F (36.7 °C)-99.3 °F (37.4 °C)] 99.3 °F (37.4 °C)  Pulse:  [60-94] 80  Resp:  [18-26] 20  SpO2:  [95 %-99 %] 95 %  BP: (127-161)/() 141/93     Weight: 81.8 kg (180 lb 5.7 oz)  Body mass index is 25.15 kg/m².    Intake/Output Summary (Last 24 hours) at 8/21/2019 0926  Last data filed at 8/21/2019 0800  Gross per 24 hour   Intake 710 ml   Output 700 ml   Net 10 ml      Physical Exam   Constitutional: He is oriented to person, place, and time. He appears well-developed and well-nourished.   HENT:   Head: Normocephalic and atraumatic.   Right Ear: External ear normal.   Left Ear: External ear normal.   Nose: Nose normal.   Mouth/Throat: Oropharynx is clear and moist.   Eyes: Pupils are equal, round, and reactive to light. Conjunctivae and EOM are normal.   Neck: Normal range of motion. Neck supple.   Cardiovascular: Normal rate, normal heart sounds and intact distal pulses.   Pulmonary/Chest: Effort normal and breath sounds normal.   Abdominal: Soft. Bowel sounds are normal.   Musculoskeletal: Normal range of motion.   Neurological: He is alert and oriented to person,  place, and time.   Left arcelia   Skin: Skin is warm and dry. Capillary refill takes less than 2 seconds.   Psychiatric: He has a normal mood and affect. His behavior is normal. Judgment and thought content normal.   Nursing note and vitals reviewed.      Significant Labs:   BMP:   Recent Labs   Lab 08/20/19 0414 08/21/19 0409   GLU 83  --    *  --    K 3.5  --      --    CO2 24  --    BUN 32*  --    CREATININE 1.7*  --    CALCIUM 8.3*  --    MG 1.9 1.9     CBC:   Recent Labs   Lab 08/20/19 0414   WBC 4.53   HGB 13.5*   HCT 41.3          Significant Imaging: I have reviewed all pertinent imaging results/findings within the past 24 hours.      Assessment/Plan:      * V-tach  And Vfib per [employer] AICD interrogation s/p 5 shocks today  Admit to ICU  Amiodarone gtt  Resume home meds  2 grams mag IV now then check level in AM   Consult Dr. Pate         Elevated troponin  Likely from heart failure and AICD shocks   Will trend      Acute decompensated heart failure  Likely from not taking his medications for 5 days   IV lasix x3 doses 12 hours apart  Accurate I&O  Daily wt   Repeat CXR in AM         Chest pain  Likely from AICD shocks and heart failure - currently chest pain free  Resume home meds  Trend cards         Hypercoagulation syndrome  Per Dr. Matta, he recommends continued treatment with Xarelto and ASA, I have stopped the plavix based on these recommendations and will defer to cardiology if they would like to continue triple therapy        VTE Risk Mitigation (From admission, onward)        Ordered     rivaroxaban tablet 15 mg  with dinner      08/18/19 0117     IP VTE HIGH RISK PATIENT  Once      08/18/19 0117     Reason for No Pharmacological VTE Prophylaxis  Once      08/18/19 0117                Demarcus Cobb MD  Department of Hospital Medicine   [facility]

## 2019-08-21 NOTE — PROGRESS NOTES
[facility]  Cardiology  Progress Note    Patient Name: Surya Villagomez  MRN: [id number]  Admission Date: 8/17/2019  Hospital Length of Stay: 4 days  Code Status: Full Code   Attending Physician: Demarcus Cobb MD   Primary Care Physician: GENIE Bird  Expected Discharge Date: 8/21/2019  Principal Problem:V-tach    Subjective:       Interval History: Denies any chest pain or shortness of breath. Denies any dizziness. Had few episodes of NSVT.    ROS   Denies any dizziness.   Denies any abdominal pain.   Denies any seizures.     Objective:     Vital Signs (Most Recent):  Temp: 99.3 °F (37.4 °C) (08/21/19 0701)  Pulse: 80 (08/21/19 0859)  Resp: 20 (08/21/19 0750)  BP: (!) 141/93 (08/21/19 0859)  SpO2: 95 % (08/21/19 0750) Vital Signs (24h Range):  Temp:  [98.1 °F (36.7 °C)-99.3 °F (37.4 °C)] 99.3 °F (37.4 °C)  Pulse:  [60-94] 80  Resp:  [18-26] 20  SpO2:  [95 %-99 %] 95 %  BP: (127-161)/() 141/93     Weight: 81.8 kg (180 lb 5.7 oz)  Body mass index is 25.15 kg/m².    SpO2: 95 %  O2 Device (Oxygen Therapy): room air      Intake/Output Summary (Last 24 hours) at 8/21/2019 0917  Last data filed at 8/21/2019 0800  Gross per 24 hour   Intake 710 ml   Output 700 ml   Net 10 ml       Lines/Drains/Airways     Peripheral Intravenous Line                 Peripheral IV - Single Lumen 08/17/19 20 G Right Hand 4 days                Scheduled Meds:   amiodarone  400 mg Oral BID    aspirin  81 mg Oral Daily    atorvastatin  20 mg Oral Daily    chlorhexidine  15 mL Mouth/Throat BID    lisinopril  10 mg Oral Daily    metoprolol succinate  50 mg Oral BID    mupirocin   Nasal BID    rivaroxaban  15 mg Oral with dinner     Continuous Infusions:    PRN Meds:.acetaminophen, hydrALAZINE, magnesium oxide, magnesium oxide, nitroGLYCERIN, ondansetron, potassium chloride 10%, potassium chloride 10%, potassium chloride 10%, potassium, sodium phosphates, potassium, sodium phosphates, potassium, sodium  phosphates, sodium chloride 0.9%     Physical Exam  HEENT: Normocephalic, atraumatic, PERRL, Conjunctiva pink, no scleral icterus.   CVS: S1S2+, RRR, no murmurs, rubs or gallops, JVP: Normal.  LUNGS: Fine crackles  ABDOMEN: Soft, NT, BS+  EXTREMITIES: No cyanosis, clubbing or edema. Left sided weakness.   NEURO: AAO X 3.     Significant Labs:   BMP:   Recent Labs   Lab 08/19/19  1343 08/20/19  0414 08/21/19  0409   GLU  --  83  --    NA  --  135*  --    K 4.2 3.5  --    CL  --  104  --    CO2  --  24  --    BUN  --  32*  --    CREATININE  --  1.7*  --    CALCIUM  --  8.3*  --    MG  --  1.9 1.9   , CMP   Recent Labs   Lab 08/19/19  1343 08/20/19  0414   NA  --  135*   K 4.2 3.5   CL  --  104   CO2  --  24   GLU  --  83   BUN  --  32*   CREATININE  --  1.7*   CALCIUM  --  8.3*   ANIONGAP  --  7*   ESTGFRAFRICA  --  49.2*   EGFRNONAA  --  42.6*   , CBC   Recent Labs   Lab 08/20/19  0414   WBC 4.53   HGB 13.5*   HCT 41.3      , INR   No results for input(s): INR, PROTIME in the last 48 hours., Lipid Panel   No results for input(s): CHOL, HDL, LDLCALC, TRIG, CHOLHDL in the last 48 hours. and Troponin   Recent Labs   Lab 08/19/19  1853   TROPONINI 0.036       Significant Imaging:     Assessment and Plan:     IMPRESSION:  Ventricular tachycardia. Multiple episodes. 210 runs of SVT since 1/2019. 9,588 VT episodes since 1/2019. On amio and metoprolol  Dilated cardiomyopathy. Out of proportion to CAD. 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 Marmolejo at [facility].   Atrial fibrillation.   ICD in situ. [employer] device.   CHF. Acute on chronic. BNP 2404.   Mildly elevated troponin. Likely related to ICD shocks. Not consistent with ACS.   Cad S/P PCI to first OM with BRADLEY 9/26/2019. Plavix recommended to be stopped by Dr. Matta.   Clotting disorder. Elevated homocysteine level and decreased Factor XII.. Left leg DVT on Xarelto/ASA. Followed by Dr. Matta.    CVA in 2011. Left leg weakness.   CKD.. Creatinine in 2018 was 1.83. 1.7 now.   HTN. Not well controlled.   Hyperlipidemia.       PLAN:  1. Patient had several episodes of NSVT.   2. Discussed with Dr Levi. Will continue amiodarone 400 mg BID X 2 weeks and Toprol 100 mg daily. Will schedule follow up with hime as an outpatient.   3. Ok to discharge home from Cardiology stand point.   4. Patient can follow up with me in 2 weeks if he wishes to. He was given info for follow up.       Modesta Pate MD  Cardiology  [facility]

## 2019-08-21 NOTE — PLAN OF CARE
08/21/19 1120   Final Note   Assessment Type Final Discharge Note   Anticipated Discharge Disposition Home     Pt discharged home this date and discharge orders reviewed.  No SS / CM needs noted at this time.  SW met with Pt at bedside, and he initially requested Medicaid Transportation.  However, after learning that it takes quite some time to arrange, he reports he will see if his  will bring him home or pay for his own cab.  He verbalized no further needs at this time.

## 2019-08-24 LAB
AV INDEX (PROSTH): 0.6
AV MEAN GRADIENT: 2 MMHG
AV PEAK GRADIENT: 3 MMHG
AV VALVE AREA: 3.68 CM2
AV VELOCITY RATIO: 70.62
BSA FOR ECHO PROCEDURE: 2.1 M2
CV ECHO LV RWT: 0.35 CM
DOP CALC AO PEAK VEL: 0.81 M/S
DOP CALC AO VTI: 12.97 CM
DOP CALC LVOT AREA: 6.1 CM2
DOP CALC LVOT DIAMETER: 2.79 CM
DOP CALC LVOT PEAK VEL: 57.2 M/S
DOP CALC LVOT STROKE VOLUME: 47.72 CM3
DOP CALCLVOT PEAK VEL VTI: 7.81 CM
E WAVE DECELERATION TIME: 121.46 MSEC
E/A RATIO: 2.9
E/E' RATIO: 16.36 M/S
ECHO LV POSTERIOR WALL: 1.11 CM (ref 0.6–1.1)
FRACTIONAL SHORTENING: 12 % (ref 28–44)
INTERVENTRICULAR SEPTUM: 1.24 CM (ref 0.6–1.1)
IVRT: 128.46 MSEC
LEFT ATRIUM SIZE: 5.45 CM
LEFT INTERNAL DIMENSION IN SYSTOLE: 5.57 CM (ref 2.1–4)
LEFT VENTRICLE DIASTOLIC VOLUME INDEX: 75.01 ML/M2
LEFT VENTRICLE DIASTOLIC VOLUME: 156.5 ML
LEFT VENTRICLE MASS INDEX: 160 G/M2
LEFT VENTRICLE SYSTOLIC VOLUME INDEX: 67.8 ML/M2
LEFT VENTRICLE SYSTOLIC VOLUME: 141.5 ML
LEFT VENTRICULAR INTERNAL DIMENSION IN DIASTOLE: 6.34 CM (ref 3.5–6)
LEFT VENTRICULAR MASS: 334.56 G
LV LATERAL E/E' RATIO: 15 M/S
LV SEPTAL E/E' RATIO: 18 M/S
MV PEAK A VEL: 0.31 M/S
MV PEAK E VEL: 0.9 M/S
PISA TR MAX VEL: 3.54 M/S
PV PEAK VELOCITY: 68.44 CM/S
RA PRESSURE: 8 MMHG
RIGHT VENTRICULAR END-DIASTOLIC DIMENSION: 291 CM
TDI LATERAL: 0.06 M/S
TDI SEPTAL: 0.05 M/S
TDI: 0.06 M/S
TR MAX PG: 50 MMHG
TV REST PULMONARY ARTERY PRESSURE: 58 MMHG

## 2019-08-26 ENCOUNTER — TELEPHONE (OUTPATIENT)
Dept: CARDIAC REHAB | Facility: HOSPITAL | Age: 59
End: 2019-08-26

## 2019-08-26 NOTE — TELEPHONE ENCOUNTER
Surya Villagomez   [id number]   8/26/2019         Spoke with: Surya Villagomez, patient    Received Medications?:yes    Follow Up Appt?:yes    Cardio Pulmonary Rehab Appt?:yes    Comments: Rescheduled patient orientation to Sept 10 @ 1:00    Zahra Daugherty RN

## 2020-04-27 NOTE — PROGRESS NOTES
"[facility]  Progress Note    Patient Name: Surya Villagomez  MRN: [id number]  Patient Class: IP- Inpatient   Admission Date: 8/17/2019  Length of Stay: 2 days  Attending Physician: Demarcus Cobb MD  Primary Care Provider: GENIE Bird        Subjective:     Principal Problem:V-tach        HPI:  Mr. Villagomez presents today with complaints of chest pain. It is severe. It is associated with getting shocked by his AICD multiple times today, SOB, and 5 lb wt gain. He denies fever, chills, N/V/D, dizziness, or LOC. He states he ran out of his cardiac medicine and [facility] couldn't refill them until today so he hasn't had his medication for 5 days. He states he started feeling the pain around 11 am, but waited for his brother to get home with his medication and take that before he came in this afternoon. He has a history of dilated cardiomyopathy, EF 25%, persistent A. fib, hypertension, CKD3, elevated homocysteine levels, decreased factor XII activity, and coagulapathy, history of CVA with left hemiplegia since 2011, LHC from August 2018 by , showed severe OM disease which was stented with 1 BRADLEY, otherwise mild nonobstructive CAD. He was then placed on a lifevest and subsequently had a biventricular AICD placed. His last appt with Dr. Marmolejo was in Feb and at that time he was taken off of zaroxlyn and decreased his lasix d/t hypotension. He states he wants to establish care in here in [place]. In the ED, his AICD was interrogated and showed he received a total of 5 shocks today beginning at 5:49am, 6:45am, and 10:55 am.       Overview/Hospital Course:  Admitted to ICU on amiodarone infusion. Seen by Oscar HURD and Bernardo NELSON today already.   8/19 remains in ICU. Amiodarone infusion finished last PM. Received 1 dose po. Had 11 beat run ventricular tachycardia this AM (~1100 hours). Asymptomatic.    Interval History: feels "better"    Review of Systems   Constitutional: " Noted. Rx sent.    Negative.    HENT: Negative.    Eyes: Negative.    Respiratory: Negative.    Cardiovascular: Negative.    Gastrointestinal: Negative.    Endocrine: Negative.    Genitourinary: Negative.    Musculoskeletal: Negative.    Skin: Negative.    Allergic/Immunologic: Negative.    Neurological: Negative.    Hematological: Negative.    All other systems reviewed and are negative.    Objective:     Vital Signs (Most Recent):  Temp: 98.2 °F (36.8 °C) (08/19/19 0701)  Pulse: 74 (08/19/19 1000)  Resp: (!) 32 (08/19/19 1000)  BP: 109/88 (08/19/19 1000)  SpO2: 98 % (08/19/19 1000) Vital Signs (24h Range):  Temp:  [97.9 °F (36.6 °C)-99.2 °F (37.3 °C)] 98.2 °F (36.8 °C)  Pulse:  [72-92] 74  Resp:  [11-44] 32  SpO2:  [80 %-99 %] 98 %  BP: (109-155)/() 109/88     Weight: 88.5 kg (195 lb 1.7 oz)  Body mass index is 27.21 kg/m².    Intake/Output Summary (Last 24 hours) at 8/19/2019 1200  Last data filed at 8/19/2019 0600  Gross per 24 hour   Intake 303.7 ml   Output 1650 ml   Net -1346.3 ml      Physical Exam   Constitutional: He is oriented to person, place, and time. He appears well-developed and well-nourished.   HENT:   Head: Normocephalic and atraumatic.   Eyes: Pupils are equal, round, and reactive to light. Conjunctivae and EOM are normal.   Neck: Normal range of motion. Neck supple.   Cardiovascular: Normal rate, regular rhythm, normal heart sounds and intact distal pulses.   Pulmonary/Chest: Effort normal and breath sounds normal.   Abdominal: Soft. Bowel sounds are normal.   Musculoskeletal: Normal range of motion.   Neurological: He is alert and oriented to person, place, and time.   Left arcelia   Skin: Skin is warm and dry. Capillary refill takes less than 2 seconds.   Psychiatric: He has a normal mood and affect. His behavior is normal. Judgment and thought content normal.   Nursing note and vitals reviewed.      Significant Labs:   BMP:   Recent Labs   Lab 08/19/19  0614   GLU 96      K 3.6      CO2 23   BUN  31*   CREATININE 1.6*   CALCIUM 9.0   MG 2.1     CBC:   Recent Labs   Lab 08/17/19  2146 08/17/19  2147 08/18/19  0607 08/19/19  0614   WBC 4.64  --  4.25 4.36   HGB 14.4  --  14.1 14.3   HCT 45.2 48 42.7 44.3     --  188 213       Significant Imaging: I have reviewed all pertinent imaging results/findings within the past 24 hours.      Assessment/Plan:      * V-tach  And Vfib per [facility] AICD interrogation s/p 5 shocks today  Admit to ICU  Amiodarone gtt  Resume home meds  2 grams mag IV now then check level in AM   Consult Dr. Pate         Elevated troponin  Likely from heart failure and AICD shocks   Will trend      Acute decompensated heart failure  Likely from not taking his medications for 5 days   IV lasix x3 doses 12 hours apart  Accurate I&O  Daily wt   Repeat CXR in AM         Chest pain  Likely from AICD shocks and heart failure - currently chest pain free  Resume home meds  Trend cards         Hypercoagulation syndrome  Per Dr. Matta, he recommends continued treatment with Xarelto and ASA, I have stopped the plavix based on these recommendations and will defer to cardiology if they would like to continue triple therapy        VTE Risk Mitigation (From admission, onward)        Ordered     rivaroxaban tablet 15 mg  with dinner      08/18/19 0117     IP VTE HIGH RISK PATIENT  Once      08/18/19 0117     Reason for No Pharmacological VTE Prophylaxis  Once      08/18/19 0117                Demarcus Cobb MD  Department of Hospital Medicine   [facility]

## 2021-07-23 NOTE — HPI
Mr. Villagomez presents today with complaints of chest pain. It is severe. It is associated with getting shocked by his AICD multiple times today, SOB, and 5 lb wt gain. He denies fever, chills, N/V/D, dizziness, or LOC. He states he ran out of his cardiac medicine and [facility] couldn't refill them until today so he hasn't had his medication for 5 days. He states he started feeling the pain around 11 am, but waited for his brother to get home with his medication and take that before he came in this afternoon. He has a history of dilated cardiomyopathy, EF 25%, persistent A. fib, hypertension, CKD3, elevated homocysteine levels, decreased factor XII activity, and coagulapathy, history of CVA with left hemiplegia since 2011, LHC from August 2018 by , showed severe OM disease which was stented with 1 BRADLEY, otherwise mild nonobstructive CAD. He was then placed on a lifevest and subsequently had a biventricular AICD placed. His last appt with Dr. Marmolejo was in Feb and at that time he was taken off of zaroxlyn and decreased his lasix d/t hypotension. He states he wants to establish care in here in [place]. In the ED, his AICD was interrogated and showed he received a total of 5 shocks today beginning at 5:49am, 6:45am, and 10:55 am.      [Dear  ___] : Dear  [unfilled], [Referral Letter:] : I am referring [unfilled] to you for further evaluation.  My most recent evaluation follows. [Referral Closing:] : Thank you very much for seeing this patient.  If you have any questions, please do not hesitate to contact me. [Sincerely,] : Sincerely, [[email or web address]] : Kenneth Yancey MD, FACS